# Patient Record
Sex: MALE | Employment: UNEMPLOYED | URBAN - METROPOLITAN AREA
[De-identification: names, ages, dates, MRNs, and addresses within clinical notes are randomized per-mention and may not be internally consistent; named-entity substitution may affect disease eponyms.]

---

## 2020-12-14 ENCOUNTER — VIRTUAL VISIT (OUTPATIENT)
Dept: PULMONOLOGY | Facility: CLINIC | Age: 35
End: 2020-12-14
Attending: GENETIC COUNSELOR, MS
Payer: COMMERCIAL

## 2020-12-14 DIAGNOSIS — Z83.49 FAMILY HISTORY OF CYSTIC FIBROSIS: ICD-10-CM

## 2020-12-14 DIAGNOSIS — K85.00 IDIOPATHIC ACUTE PANCREATITIS: Primary | ICD-10-CM

## 2020-12-14 DIAGNOSIS — E84.9 CYSTIC FIBROSIS (H): Primary | ICD-10-CM

## 2020-12-14 DIAGNOSIS — K85.00 IDIOPATHIC ACUTE PANCREATITIS, UNSPECIFIED COMPLICATION STATUS: ICD-10-CM

## 2020-12-14 DIAGNOSIS — Z71.83 ENCOUNTER FOR NONPROCREATIVE GENETIC COUNSELING: ICD-10-CM

## 2020-12-14 LAB — SWEAT CHLORIDE: NORMAL MMOL/L CL (ref 0–30)

## 2020-12-14 PROCEDURE — 89230 COLLECT SWEAT FOR TEST: CPT | Performed by: INTERNAL MEDICINE

## 2020-12-14 PROCEDURE — 96040 HC GENETIC COUNSELING, EACH 30 MINUTES: CPT | Mod: GT | Performed by: GENETIC COUNSELOR, MS

## 2020-12-14 RX ORDER — CARVEDILOL 6.25 MG/1
TABLET ORAL
COMMUNITY
Start: 2020-03-16 | End: 2024-05-14

## 2020-12-14 RX ORDER — LISINOPRIL 5 MG/1
TABLET ORAL
COMMUNITY
Start: 2020-03-16

## 2020-12-14 NOTE — LETTER
12/14/2020      RE: Cheo Padron  [address] [place] [address]       No notes on file    Nicol Langston GC

## 2020-12-14 NOTE — Clinical Note
12/14/2020      RE: Cheo Padron  [address] [place] [address]       No notes on file    Nicol Langston GC

## 2020-12-14 NOTE — LETTER
12/14/2020      RE: Cheo Padron  [address] [place] [address]       Presenting information:   Cheo Padron is a 35 year old male with a history of pancreatis and a family history of CFTR-related metabolic syndrome (CRMS). He was [facility] due to his personal inconclusive CFTR genetic testing and history of pancreatitis, and seen virtually today at [facility] after a sweat chloride test. I met with Cheo and his partner Jo by phone today to obtain a personal and family history, discuss the genetics and inheritance of cystic fibrosis, and to discuss Cheo's sweat chloride test results from today.     Personal History:   Cheo has two sons who have a diagnosis of CRMS with R117H and 5T-TG12 CFTR mutations. 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 This genetic testing was performed at [facility] and scanned into the EMR. Cheo was healthy at that time. However, recently he developed pancreatitis and was therefore referred to [facility] for a sweat chloride test and genetic counseling.    Cheo was diagnosed with pancreatitis at 35 years and notes 3 episodes with abdominal pain and vomiting in the last year. He also notes an incidence 1 year ago with abdominal pain/cramping that he did not seek evaluation for as it stopped soon. Previous clinic notes state this was thought to be ETOH related. Cheo notes history of alcohol and tobacco usage. Cheo does not follow with GI specifically for this history, though it may be in the works. I will see if our team is able to help place a GI referral.    Cheo denies history of pulmonary symptoms including no chronic cough, infections, or hospitalizations. He denies history of sinus problems, failure to thrive as a child,  and diabetes. His children were conceived naturally with no infertility.    Additional history includes coronary artery disease with a reported heart attach 8 years ago (~28 years), CHF, HTN, and elevated lipids. Given the young age of onset for this history, the family would be recommended to let their providers know so they can get appropriate screening.     Family History:   A three generation pedigree was obtained today and sent to be scanned into the EMR. The family history was significant for the following:    Wendy have two sons together, who are 10 and 8 years old. They reportedly were diagnosed with CRMS after birth and had genetic testing that found R117H and 5T-TG12 mutations in trans. They are doing well and [facility]. Cheo has a 17 year old daughter who has a different mother than his sons, who is healthy. Jo also has a 18 year old son (not biologically related to Cheo) who is a carrier for the R117H mutation and healthy.    Cheo has two full brothers and four maternal half siblings, who are in their 20's-40's and healthy. One half sister has no children; Cheo was unsure of if this was due to choice/lifestyle or infertility. Cheo's nieces/nephews are healthy.    Cheo's mother has a history of high blood pressure. She has three siblings, one of whom has a history of lung problems thought to be related to smoking. Cheo notes one maternal first cousin who had open heart surgery; maternal first cousins are otherwise healthy. Cheo's maternal grandmother passed in her late 60's from heart problems diagnosed at an unknown age. She had a history of open heart surgery. Cheo's maternal grandfather has passed but health history is unknown.    Cheo's father has a history of high blood pressure. Extended paternal health history was unknown.     The family history was otherwise negative for CF, pancreatitis, infertility, and known genetic conditions.    Cheo notes   ancestry. Known consanguinity was denied.     Discussion:   Genes are long stretches of DNA that are responsible for how our bodies look and how our bodies work. We all have two copies of every gene, one inherited from our mother and one inherited from our father. When there is a change, called a mutation, in a gene it can cause it to not do its job correctly which can cause the signs and symptoms of a genetic condition.      Classical cystic fibrosis (CF) causes a person to produce thick, sticky mucus due to an imbalance of salt and water in and out of the cells. This affects the lungs, sinuses, digestive system, and male reproductive system, among other areas of the body. Children who are diagnosed with classical cystic fibrosis need respiratory therapy several times a day, and may need to take pills to help their body to digest food. However, it is important to remember that there can be great variation in the symptoms that a person may have. There is great variability in symptoms in individuals with CF, where some individuals have very mild symptoms or are only diagnosed in adulthood as a result of an infertility work-up, while others have significant symptoms as infants and are diagnosed soon after birth. We can think of this as a spectrum. Genetic testing, sweat chloride testing, and someone's personal and family history all can help assess where someone may be in this spectrum, though we are not able to predict exactly what someone's presentation may look like or what exact symptoms they will develop when.     CFTR disorders including cystic fibrosis are caused by mutations in a gene called CFTR.  CFTR disorders are inherited in an autosomal recessive pattern.  This means that to be affected an individual must inherit a mutation in both copies of the CFTR gene (one from each parent). These exact mutations can be more severe or mild, which can affect presentation/severity for an  individual.      Individuals with just one mutation in the CFTR gene are said to be carriers.  Carriers do not have cystic fibrosis but can have a child with CF if their partner is also a carrier or has a CFTR disorder themselves.  When two parents are carriers, then with each pregnancy together, there is a 25% chance to have a child with cystic fibrosis, a 50% chance to have a child that is an unaffected carrier, and a 25% chance to have a child that is an unaffected non-carrier. No one has control over which copy they pass on to their child since it is a random process. These exact chances change if one parent personally has a CFTR disorder.     While most people with two CFTR mutations have classic cystic fibrosis, CFTR mutations can also cause milder CFTR disorders. The diagnosis of cystic fibrosis is made by a sweat chloride test of >60 mmol/L (normal range <30 mmol/L), and/or the presence of two severe CF-causing mutations in the CFTR gene.  A diagnosis of a CFTR-related metabolic syndrome (CRMS) is made when an individual has a positive  screen for CF, but at least one of their mutations is not classified as severe CF-causing (or genetic testing is inconclusive) and their sweat chloride test is <60 mmol/L.      Past Genetic Testing:  Cheo's previous genetic testing found a 5T-TG12 mutation and a variant of unknown significance p.N0192P (aka c.4028G>T) in CFTR. The numbers and letters in this result stand for exactly where in the gene the genetic change is located and what change was found.    The 5T change or mutation references a part of the CFTR gene. This portion of the CFTR gene is called the Poly-T tract. If one thinks of the genetic alphabet using letters A, C, T, and G, it is an area of the gene that has many T s in a row. If there are 5T repeats, this can act as a mild or possibly moderate CF mutation. In combination with another CF mutation, one inherited from each parent, this can  "potentially cause symptoms of CF. However, there is much variability seen between individuals, and we are not able to predict the symptoms, if any, individuals with this genetic combination might have.     The \"TG12  portions of the 5T-TG12 mutation also refers to a portion of the CFTR gene that has several  TG  s in a row. Every person has this section of the CFTR gene, and it is usually seen as 10TG, 11TG, 12TG, or 13TG. A person with 12TG or 13TG along with a 5T is known to be more likely to develop non-classic CF than a person with 10TG or 11TG along with a 5T.     The  p.L0039P mutation is a very rare mutation, and currently classified as a variant of unknown significance. It is unknown if this VUS is truly pathogenic or benign at this time. A variant of unknown significance (VUS) is a change in the DNA sequence of a particular gene where it is not known if it causes the gene to not work correctly/could cause a condition, or if it is normal benign variation that does not affect the gene. Usually more research/evidence over time is needed to determine a VUS's significance. It is certainly possible that at some point in the future more could be known about this variant and it could be reclassified. Even if this mutation is pathogenic, it is unknown at this time if it is a severe or mild CFTR mutation.    Sweat Chloride Test Results:  A sweat chloride test is a test used to diagnose an individual with cystic fibrosis. The possible results of the sweat test were reviewed briefly, including a positive, negative, or borderline result. A positive sweat chloride test is consistent with a diagnosis of cystic fibrosis.     Cheo's sweat test today returned positive at 67 and 69 mmol/L (normal range <30 mmol/L, diagnostic for CF >60 mmol/L). This is consistent with a diagnosis of cystic fibrosis for Cheo. We discussed that this diagnosis + Cheo's genetic testing (1 variable mutation and 1 VUS) do not clarify which " symptoms he may ever develop and when. Individuals with CF with at least one mild CFTR mutation can vary widely in symptoms (if any) and severity, though often do not have classic CF presentation.  It is certainly possible that CF at least plays a role in Cheo's pancreatitis. The fact that Cheo has done well other than pancreatitis is reassuring, but does not rule out other CF symptoms and establishing care with an adult CF provider would be recommended for Cheo. I will have the team call him to schedule this. Management would be discussed further at that time.    Given Cheo's positive sweat test, we discussed that we may be more suspicious that the p.[id number] VUS is a true CFTR mutation, though additional information is needed. I will ask Ambry if his positive sweat test would change this variant's classification or not, and if additional information is known about this variant currently. I will follow up with Cheo if additional information is known now, otherwise the family could check in about variant reclassification periodically.     We understand that this diagnosis and appointment can be a lot of information at once. We continue to be a resource for the family as they process this information and next steps. One good resource is the Cystic Fibrosis Foundation: https://www.cff.org/Life-With-CF/Daily-Life/Adult-Guide-to-Cystic-Fibrosis/. If other resources are helpful (medical, support, or both) I am happy to help assist with this at any time. My number was given today for any questions or requests.     Family Members:  Assuming Cheo has a mutation on each of his CFTR genes, no matter which copy he passes on, his children will inherit a mutation. Whether or not they have a CFTR disorder or are just a carrier depends on whether or not his partner was a carrier. If they are a carrier (like Jo), with each pregnancy there would be a 50% chance for a child to have a CFTR disorder and a 50% chance for a  child to be only a carrier. Predicted presentation/severity of a CFTR disorder depends on exact mutations an individual inherits. If a partner was not a carrier, chances for children to have a CFTR disorder would be low. [facility] with previous genetic counseling. Pre and  testing options for any future children were not reviewed today, but can be in the future if helpful. Lastly, Cheo's daughter would be expected to be at least a CF carrier (depending on if her mother was a carrier), and genetic counseling would be available to her (and her partner) when older and potentially thinking of starting a family, or sooner if she had any CF concerns.     Other asymptomatic family members also have an increased chance to be a carrier for CF and it is important for this information be shared with extended family. Specifically, we normally do not recommend familial testing for a VUS as we are unable to interpret its significance. However, familial carrier testing for the 5T mutation (and the VUS if later reclassified) and/or genetic counseling to discuss reproductive information for family members would be available if helpful. If another extended family member is found to be a carrier for CF, their partner could be tested to determine if he or she is also a carrier. Carrier testing can be pursued here at [facility] by calling our direct number, or by asking a PCP for a genetic counseling referral locally.     Lastly, Cheo's siblings could also have a CFTR-related disorder. A sweat test and genetics appointment to discuss the family history of CF would be recommended for siblings to assess if they have CF and subsequent management for them, especially if they have any concerning symptoms. We are happy to see additional family members at [place] if interested. I am also happy to write a family letter to share the above information and recommendations with siblings if  helpful.       It was a pleasure to meet with Cheo virtually today. He had no additional questions at this time. Contact information was shared for any future questions or concerns that arise.     Plan:   1. Cheo's sweat chloride test was positive today. This is consistent with a diagnosis of cystic fibrosis.  2. Cheo will be contacted about setting up an adult CF clinic appointment here given this diagnosis. Follow up as recommended by this provider.  3. I will ask [facility] about reclassification of his VUS, and follow up with Cheo if new information is known or variant classification changes.  4. I will see if the team can help place a GI referral for Cheo given his history of pancreatitis and new CF diagnosis.  5. Contact information was provided.        Ramona Langston MS, [facility]  Genetic Counselor  Division of Genetics and Metabolism  [facility]   Phone: [phone]  Pager: [phone]       CC: Patient, PCP, Dr. Alamo    Approx time spent phone: 57 minutes      Nicol Langston GC

## 2020-12-14 NOTE — LETTER
12/14/2020      RE: Cheo Padron  [address] [place] [address]       Presenting information:   Cheo Padron is a 35 year old male with a history of pancreatis and a family history of CFTR-related metabolic syndrome (CRMS). He was [facility] due to his personal inconclusive CFTR genetic testing and history of pancreatitis, and seen virtually today at [facility] after a sweat chloride test. I met with Cheo and his partner Jo by phone today to obtain a personal and family history, discuss the genetics and inheritance of cystic fibrosis, and to discuss Cheo's sweat chloride test results from today.     Personal History:   Cheo has two sons who have a diagnosis of CRMS with R117H and 5T-TG12 CFTR mutations. 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 This genetic testing was performed at [facility] and scanned into the EMR. Cheo was healthy at that time. However, recently he developed pancreatitis and was therefore referred to [facility] for a sweat chloride test and genetic counseling.    Cheo was diagnosed with pancreatitis at 35 years and notes 3 episodes with abdominal pain and vomiting in the last year. He also notes an incidence 1 year ago with abdominal pain/cramping that he did not seek evaluation for as it stopped soon. Previous clinic notes state this was thought to be ETOH related. Cheo notes history of alcohol and tobacco usage. Cheo does not follow with GI specifically for this history, though it may be in the works. I will see if our team is able to help place a GI referral.    Cheo denies history of pulmonary symptoms including no chronic cough, infections, or hospitalizations. He denies history of sinus problems, failure to thrive as a  child, and diabetes. His children were conceived naturally with no infertility.    Additional history includes coronary artery disease with a reported heart attach 8 years ago (~28 years), CHF, HTN, and elevated lipids. Given the young age of onset for this history, the family would be recommended to let their providers know so they can get appropriate screening.     Family History:   A three generation pedigree was obtained today and sent to be scanned into the EMR. The family history was significant for the following:    Wendy have two sons together, who are 10 and 8 years old. They reportedly were diagnosed with CRMS after birth and had genetic testing that found R117H and 5T-TG12 mutations in trans. They are doing well and [facility]. Cheo has a 17 year old daughter who has a different mother than his sons, who is healthy. Jo also has a 18 year old son (not biologically related to Cheo) who is a carrier for the R117H mutation and healthy.    Cheo has two full brothers and four maternal half siblings, who are in their 20's-40's and healthy. One half sister has no children; Cheo was unsure of if this was due to choice/lifestyle or infertility. Cheo's nieces/nephews are healthy.    Cheo's mother has a history of high blood pressure. She has three siblings, one of whom has a history of lung problems thought to be related to smoking. Cheo notes one maternal first cousin who had open heart surgery; maternal first cousins are otherwise healthy. Cheo's maternal grandmother passed in her late 60's from heart problems diagnosed at an unknown age. She had a history of open heart surgery. Cheo's maternal grandfather has passed but health history is unknown.    Cheo's father has a history of high blood pressure. Extended paternal health history was unknown.     The family history was otherwise negative for CF, pancreatitis, infertility, and known genetic conditions.    Cheo notes   ancestry. Known consanguinity was denied.     Discussion:   Genes are long stretches of DNA that are responsible for how our bodies look and how our bodies work. We all have two copies of every gene, one inherited from our mother and one inherited from our father. When there is a change, called a mutation, in a gene it can cause it to not do its job correctly which can cause the signs and symptoms of a genetic condition.      Classical cystic fibrosis (CF) causes a person to produce thick, sticky mucus due to an imbalance of salt and water in and out of the cells. This affects the lungs, sinuses, digestive system, and male reproductive system, among other areas of the body. Children who are diagnosed with classical cystic fibrosis need respiratory therapy several times a day, and may need to take pills to help their body to digest food. However, it is important to remember that there can be great variation in the symptoms that a person may have. There is great variability in symptoms in individuals with CF, where some individuals have very mild symptoms or are only diagnosed in adulthood as a result of an infertility work-up, while others have significant symptoms as infants and are diagnosed soon after birth. We can think of this as a spectrum. Genetic testing, sweat chloride testing, and someone's personal and family history all can help assess where someone may be in this spectrum, though we are not able to predict exactly what someone's presentation may look like or what exact symptoms they will develop when.     CFTR disorders including cystic fibrosis are caused by mutations in a gene called CFTR.  CFTR disorders are inherited in an autosomal recessive pattern.  This means that to be affected an individual must inherit a mutation in both copies of the CFTR gene (one from each parent). These exact mutations can be more severe or mild, which can affect presentation/severity for an  individual.      Individuals with just one mutation in the CFTR gene are said to be carriers.  Carriers do not have cystic fibrosis but can have a child with CF if their partner is also a carrier or has a CFTR disorder themselves.  When two parents are carriers, then with each pregnancy together, there is a 25% chance to have a child with cystic fibrosis, a 50% chance to have a child that is an unaffected carrier, and a 25% chance to have a child that is an unaffected non-carrier. No one has control over which copy they pass on to their child since it is a random process. These exact chances change if one parent personally has a CFTR disorder.     While most people with two CFTR mutations have classic cystic fibrosis, CFTR mutations can also cause milder CFTR disorders. The diagnosis of cystic fibrosis is made by a sweat chloride test of >60 mmol/L (normal range <30 mmol/L), and/or the presence of two severe CF-causing mutations in the CFTR gene.  A diagnosis of a CFTR-related metabolic syndrome (CRMS) is made when an individual has a positive  screen for CF, but at least one of their mutations is not classified as severe CF-causing (or genetic testing is inconclusive) and their sweat chloride test is <60 mmol/L.      Past Genetic Testing:  Cheo's previous genetic testing found a 5T-TG12 mutation and a variant of unknown significance p.[id number] (aka c.4028G>T) in CFTR. The numbers and letters in this result stand for exactly where in the gene the genetic change is located and what change was found.    The 5T change or mutation references a part of the CFTR gene. This portion of the CFTR gene is called the Poly-T tract. If one thinks of the genetic alphabet using letters A, C, T, and G, it is an area of the gene that has many T s in a row. If there are 5T repeats, this can act as a mild or possibly moderate CF mutation. In combination with another CF mutation, one inherited from each parent, this can  "potentially cause symptoms of CF. However, there is much variability seen between individuals, and we are not able to predict the symptoms, if any, individuals with this genetic combination might have.     The \"TG12  portions of the 5T-TG12 mutation also refers to a portion of the CFTR gene that has several  TG  s in a row. Every person has this section of the CFTR gene, and it is usually seen as 10TG, 11TG, 12TG, or 13TG. A person with 12TG or 13TG along with a 5T is known to be more likely to develop non-classic CF than a person with 10TG or 11TG along with a 5T.     The  p.T2470R mutation is a very rare mutation, and currently classified as a variant of unknown significance. It is unknown if this VUS is truly pathogenic or benign at this time. A variant of unknown significance (VUS) is a change in the DNA sequence of a particular gene where it is not known if it causes the gene to not work correctly/could cause a condition, or if it is normal benign variation that does not affect the gene. Usually more research/evidence over time is needed to determine a VUS's significance. It is certainly possible that at some point in the future more could be known about this variant and it could be reclassified. Even if this mutation is pathogenic, it is unknown at this time if it is a severe or mild CFTR mutation.    Sweat Chloride Test Results:  A sweat chloride test is a test used to diagnose an individual with cystic fibrosis. The possible results of the sweat test were reviewed briefly, including a positive, negative, or borderline result. A positive sweat chloride test is consistent with a diagnosis of cystic fibrosis.     Cheo's sweat test today returned positive at 67 and 69 mmol/L (normal range <30 mmol/L, diagnostic for CF >60 mmol/L). This is consistent with a diagnosis of cystic fibrosis for Cheo. We discussed that this diagnosis + Cheo's genetic testing (1 variable mutation and 1 VUS) do not clarify which " symptoms he may ever develop and when. Individuals with CF with at least one mild CFTR mutation can vary widely in symptoms (if any) and severity, though often do not have classic CF presentation.  It is certainly possible that CF at least plays a role in Cheo's pancreatitis. The fact that Cheo has done well other than pancreatitis is reassuring, but does not rule out other CF symptoms and establishing care with an adult CF provider would be recommended for Cheo. I will have the team call him to schedule this. Management would be discussed further at that time.    Given Cheo's positive sweat test, we discussed that we may be more suspicious that the p.Q4842M VUS is a true CFTR mutation, though additional information is needed. I will ask Ambry if his positive sweat test would change this variant's classification or not, and if additional information is known about this variant currently. I will follow up with Cheo if additional information is known now, otherwise the family could check in about variant reclassification periodically.     We understand that this diagnosis and appointment can be a lot of information at once. We continue to be a resource for the family as they process this information and next steps. One good resource is the Cystic Fibrosis Foundation: https://www.cff.org/Life-With-CF/Daily-Life/Adult-Guide-to-Cystic-Fibrosis/. If other resources are helpful (medical, support, or both) I am happy to help assist with this at any time. My number was given today for any questions or requests.     Family Members:  Assuming Cheo has a mutation on each of his CFTR genes, no matter which copy he passes on, his children will inherit a mutation. Whether or not they have a CFTR disorder or are just a carrier depends on whether or not his partner was a carrier. If they are a carrier (like Jo), with each pregnancy there would be a 50% chance for a child to have a CFTR disorder and a 50% chance for a  child to be only a carrier. Predicted presentation/severity of a CFTR disorder depends on exact mutations an individual inherits. If a partner was not a carrier, chances for children to have a CFTR disorder would be low. [facility] with previous genetic counseling. Pre and  testing options for any future children were not reviewed today, but can be in the future if helpful. Lastly, Cheo's daughter would be expected to be at least a CF carrier (depending on if her mother was a carrier), and genetic counseling would be available to her (and her partner) when older and potentially thinking of starting a family, or sooner if she had any CF concerns.     Other asymptomatic family members also have an increased chance to be a carrier for CF and it is important for this information be shared with extended family. Specifically, we normally do not recommend familial testing for a VUS as we are unable to interpret its significance. However, familial carrier testing for the 5T mutation (and the VUS if later reclassified) and/or genetic counseling to discuss reproductive information for family members would be available if helpful. If another extended family member is found to be a carrier for CF, their partner could be tested to determine if he or she is also a carrier. Carrier testing can be pursued here at [facility] by calling our direct number, or by asking a PCP for a genetic counseling referral locally.     Lastly, Cheo's siblings could also have a CFTR-related disorder. A sweat test and genetics appointment to discuss the family history of CF would be recommended for siblings to assess if they have CF and subsequent management for them, especially if they have any concerning symptoms. We are happy to see additional family members at [place] if interested. I am also happy to write a family letter to share the above information and recommendations with siblings if  helpful.       It was a pleasure to meet with Cheo virtually today. He had no additional questions at this time. Contact information was shared for any future questions or concerns that arise.     Plan:   1. Cheo's sweat chloride test was positive today. This is consistent with a diagnosis of cystic fibrosis.  2. Cheo will be contacted about setting up an adult CF clinic appointment here given this diagnosis. Follow up as recommended by this provider.  3. I will ask [facility] about reclassification of his VUS, and follow up with Cheo if new information is known or variant classification changes.  4. I will see if the team can help place a GI referral for Cheo given his history of pancreatitis and new CF diagnosis.  5. Contact information was provided.        Ramona Langston MS, [facility]  Genetic Counselor  Division of Genetics and Metabolism  [facility]   Phone: [phone]  Pager: [phone]       CC: Patient, PCP, Dr. Alamo    Approx time spent phone: 57 minutes

## 2020-12-14 NOTE — NURSING NOTE
"Cheo Padron is a 35 year old male who is being evaluated via a billable video visit.      The patient has been notified of following:     \"This video visit will be conducted via a call between you and your physician/provider. We have found that certain health care needs can be provided without the need for an in-person physical exam.  This service lets us provide the care you need with a video conversation.  If a prescription is necessary we can send it directly to your pharmacy.  If lab work is needed we can place an order for that and you can then stop by our lab to have the test done at a later time.    Video visits are billed at different rates depending on your insurance coverage.  Please reach out to your insurance provider with any questions.    If during the course of the call the physician/provider feels a video visit is not appropriate, you will not be charged for this service.\"     How would you like to obtain your AVS? Pato    Cheo Padron complains of  No chief complaint on file.      Patient has given verbal consent for Video visit? Yes    Patient would like the video invitation sent by: Text to cell phone: [phone]     I have reviewed and updated the patient's medication list, allergies and preferred pharmacy.      Smooth Miles LPN  "

## 2020-12-25 SDOH — HEALTH STABILITY: MENTAL HEALTH: HOW OFTEN DO YOU HAVE A DRINK CONTAINING ALCOHOL?: NOT ASKED

## 2020-12-25 SDOH — HEALTH STABILITY: MENTAL HEALTH: HOW MANY STANDARD DRINKS CONTAINING ALCOHOL DO YOU HAVE ON A TYPICAL DAY?: NOT ASKED

## 2020-12-25 SDOH — HEALTH STABILITY: MENTAL HEALTH: HOW OFTEN DO YOU HAVE 6 OR MORE DRINKS ON ONE OCCASION?: NOT ASKED

## 2020-12-26 NOTE — PROGRESS NOTES
"Reason for Visit  Cheo Padron is a 35 year old year old male who is being seen for Consult (New diagnosis of CF pancreastitis )    CF HPI  The patient was seen and examined by Presley Alamo MD   Cheo Padron is a 35 year old male  with HTN, hyperlipidemia, Mantoux positive, CHF and premature - CAD. He is a smoker. He is being seen today after w/u for pancreatitis and has a child with CF. H was worked up and it showed positive sweat test.    He was admitted in 7/2020 with alcohol induced pancreatitis.     His symptoms started in December 2019.  It started off with abdominal pain and nausea vomiting.  It lasted for about 3 to 4 days and then it resolved.  This was preceded by binge drinking.  He used to drink 3-4 drinks every day and sometimes on weekends up to 14-15 drinks.  The pain resolved and he was doing well up until February 2020.  This pain recurred along with nausea and vomiting.  This again lasted for a few days and resolved.  This also was associated with binge drinking episode.    The symptoms recurred in July 2020 at which time he did go to the emergency room to have it evaluated.  He was diagnosed with pancreatitis.  Since his hospital stay the pain in his abdomen is slowly improved.  He is now having about a level 3 pain on and off throughout the day.  He notices the pain is worse especially when he eats \"heavy meaty meal\".  The pain is of mild crampy quality most of the time.  And when he eats a heavy meal is the pain is more worse and it last longer.  He is taken to eating light foods mostly salads.  He has lost about 10 pounds in the last 6 months.  He does not having any nausea or vomiting at this time.  Does not have any constipation or loose stools.  He is taking up to 2-3 Tylenols per day to manage the pain.  He has also taken ibuprofen to manage the pain.    He has stopped drinking for good.  Current Outpatient Medications   Medication     acetaminophen (TYLENOL) 325 MG tablet "     atorvastatin (LIPITOR) 80 MG tablet     carvedilol (COREG) 6.25 MG tablet     fexofenadine (ALLEGRA) 180 MG tablet     ibuprofen (ADVIL/MOTRIN) 400 MG tablet     lisinopril (ZESTRIL) 5 MG tablet     No current facility-administered medications for this visit.      Allergies   Allergen Reactions     Enoxaparin Hives     Urticaria started 4 days after starting lovenox.    Unclear if rash due to lovenox but this seems most likely today (7/9/2012).    One week ago started metoprolol, asa, plavix, lisinopril, simvastatin, and spironolactone.       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     ST elevation myocardial infarction involving left anterior descending (LAD) coronary artery (H) 2012       Past Surgical History:   Procedure Laterality Date     CT CORONARY ANGIOGRAM  07/01/2012    Has two drug eluting stents       Social History     Socioeconomic History     Marital status: Single     Spouse name: Not on file     Number of children: Not on file     Years of education: Not on file     Highest education level: Not on file   Occupational History     Not on file   Social Needs     Financial resource strain: Not on file     Food insecurity     Worry: Not on file     Inability: Not on file     Transportation needs     Medical: Not on file     Non-medical: Not on file   Tobacco Use     Smoking status: Current Every Day Smoker     Types: Cigarettes     Smokeless tobacco: Never Used   Substance and Sexual Activity     Alcohol use: Not Currently     Drug use: Not Currently     Sexual activity: Not on file   Lifestyle     Physical activity     Days per week: Not on file     Minutes per session: Not on file     Stress: Not on file   Relationships     Social connections     Talks on phone: Not on file     Gets together: Not on file  "    Attends Jewish service: Not on file     Active member of club or organization: Not on file     Attends meetings of clubs or organizations: Not on file     Relationship status: Not on file     Intimate partner violence     Fear of current or ex partner: Not on file     Emotionally abused: Not on file     Physically abused: Not on file     Forced sexual activity: Not on file   Other Topics Concern     Parent/sibling w/ CABG, MI or angioplasty before 65F 55M? Not Asked   Social History Narrative     Not on file       ROS Pulmonary  Constitutional- Negative  Eyes- Negative  Ear, nose and throat- Has allergies and ear is plugged up.  Cardiac- Negative  Pulm- Negative  GI- See HPI  Genitourinary- Negative  Musculoskeletal- Negative  Neurology- Negative  Dermatology- Negative  Endocrine- Negative  Lymphatic- Negative  Psychiatry- Negative    A complete ROS was otherwise negative except as noted in the HPI.  /84   Pulse 72   Resp 17   Ht 1.709 m (5' 7.3\")   Wt 66.2 kg (146 lb)   SpO2 95%   BMI 22.66 kg/m     Exam:   GENERAL APPEARANCE: Well developed, well nourished, alert, and in no apparent distress.  EYES: PERRL, EOMI  HENT: Nasal mucosa with no edema and no hyperemia. No nasal polyps.  EARS: Both Ear canals full of \"Wax\", TMs normal  MOUTH: Oral mucosa is moist, without any lesions, no tonsillar enlargement, no oropharyngeal exudate.  NECK: supple, no masses, no thyromegaly.  LYMPHATICS: No significant axillary, cervical, or supraclavicular nodes.  RESP: normal percussion, good air flow throughout.  No crackles. No rhonchi. No wheezes.  CV: Normal S1, S2, regular rhythm, normal rate. No murmur.  No rub. No gallop. No LE edema.   ABDOMEN:  Bowel sounds normal, soft, nontender, no HSM or masses.   MS: extremities normal. No clubbing. No cyanosis.  SKIN: no rash on limited exam  NEURO: Mentation intact, speech normal, normal strength and tone, normal gait and stance  PSYCH: mentation appears normal. and " affect normal/bright.    Results:  Recent Results (from the past 168 hour(s))   General PFT Lab (Please always keep checked)    Collection Time: 12/28/20  7:49 AM   Result Value Ref Range    FVC-Pred 4.47 L    FVC-Pre 3.67 L    FVC-%Pred-Pre 82 %    FEV1-Pre 3.13 L    FEV1-%Pred-Pre 84 %    FEV1FVC-Pred 83 %    FEV1FVC-Pre 85 %    FEFMax-Pred 9.64 L/sec    FEFMax-Pre 8.89 L/sec    FEFMax-%Pred-Pre 92 %    FEF2575-Pred 3.81 L/sec    FEF2575-Pre 3.61 L/sec    RDA1210-%Pred-Pre 94 %    ExpTime-Pre 6.73 sec    FIFMax-Pre 6.04 L/sec    FEV1FEV6-Pred 82 %    FEV1FEV6-Pre 85 %         Results as noted above.    PFT Interpretation:  Normal spirometry.  Valid Maneuver      Assessment and plan: Cheo Padron is a 35 year old male  with HTN, hyperlipidemia, CHF and premature - CAD. He is a smoker. He is being seen today after w/u for pancreatitis showed positive sweat test.    #. CF vs. CFTR disorder: He has essentially normal lung function with no pulmonary symptoms. His CF sweat chloride tests were positive.    CF genetics: 5T-TG12 plus a VUS ([id number]).   Will have CF  weigh in on this.  Last CXR from 9/14/2016 per report did not show any bronchiectasis.  We will repeat Spirometry at next visit and check CXR today.    #. Pancreatitis: His first episode was on 7/2012 requiring hospitalization. He has had atleast two other episodes prior to that. it was felt to be ETOH induced, it is difficult to r/o  or rule in CF playing a role. His ongoing sx while not typically raise the question of chronic pancreatitis.   - We will try enzymes to see if this will help.  - Consult Dr. Beth (GI)    #. Premature CAD: Followed by Dr. Rothman. He had presented with STEMI on 7/1/2012. This required revasc with drug eluting stents x2 from prox to mid LAD.   - currently on Aspirin.    #. Ischemic CMP: His last MRI in 7/2012 was 41% with large WMA consistent LAD distribution infarction. Last Echo on 7/2013 showed an EF of 40 - 45%  with akinesis of the antr and anteroseptal walls from base to apex. No significant valvular disease.  Last Stress test on 5/2015  was neg with 12.8 METS achieved    #. HTN: BP well controlled on Carvedilol and ACEI.    #. Hypertriglyceridemia: He is on Statins.    #. Ongoing Tobacco abuse: He has attempted to quit smoking unsuccessfully and would like to quit. He has Chantix at home. We will continue to encourage him.    #. HCM: Has not had flu vaccination todate.  - We will obtain annual labs.    Presley Alamo MD.  Pulmonary and Critical Care.  12/28/2020  10:02 AM

## 2020-12-28 ENCOUNTER — OFFICE VISIT (OUTPATIENT)
Dept: PULMONOLOGY | Facility: CLINIC | Age: 35
End: 2020-12-28
Attending: INTERNAL MEDICINE
Payer: COMMERCIAL

## 2020-12-28 ENCOUNTER — ANCILLARY PROCEDURE (OUTPATIENT)
Dept: GENERAL RADIOLOGY | Facility: CLINIC | Age: 35
End: 2020-12-28
Attending: INTERNAL MEDICINE
Payer: COMMERCIAL

## 2020-12-28 VITALS
HEIGHT: 67 IN | OXYGEN SATURATION: 95 % | RESPIRATION RATE: 17 BRPM | WEIGHT: 146 LBS | DIASTOLIC BLOOD PRESSURE: 84 MMHG | BODY MASS INDEX: 22.91 KG/M2 | SYSTOLIC BLOOD PRESSURE: 128 MMHG | HEART RATE: 72 BPM

## 2020-12-28 DIAGNOSIS — K85.00 IDIOPATHIC ACUTE PANCREATITIS WITHOUT INFECTION OR NECROSIS: ICD-10-CM

## 2020-12-28 DIAGNOSIS — Z83.49 FAMILY HISTORY OF CYSTIC FIBROSIS: ICD-10-CM

## 2020-12-28 DIAGNOSIS — Z83.49 FAMILY HISTORY OF CYSTIC FIBROSIS: Primary | ICD-10-CM

## 2020-12-28 LAB
ALBUMIN SERPL-MCNC: 4.1 G/DL (ref 3.4–5)
ALBUMIN UR-MCNC: NEGATIVE MG/DL
ALP SERPL-CCNC: 80 U/L (ref 40–150)
ALT SERPL W P-5'-P-CCNC: 19 U/L (ref 0–70)
ANION GAP SERPL CALCULATED.3IONS-SCNC: 4 MMOL/L (ref 3–14)
APPEARANCE UR: CLEAR
AST SERPL W P-5'-P-CCNC: 16 U/L (ref 0–45)
BASOPHILS # BLD AUTO: 0 10E9/L (ref 0–0.2)
BASOPHILS NFR BLD AUTO: 0.4 %
BILIRUB UR QL STRIP: NEGATIVE
BUN SERPL-MCNC: 4 MG/DL (ref 7–30)
CALCIUM SERPL-MCNC: 8.9 MG/DL (ref 8.5–10.1)
CHLORIDE SERPL-SCNC: 107 MMOL/L (ref 94–109)
CHOLEST SERPL-MCNC: 160 MG/DL
CO2 SERPL-SCNC: 30 MMOL/L (ref 20–32)
COLOR UR AUTO: YELLOW
CREAT SERPL-MCNC: 0.87 MG/DL (ref 0.66–1.25)
CREAT UR-MCNC: 308 MG/DL
DEPRECATED CALCIDIOL+CALCIFEROL SERPL-MC: 11 UG/L (ref 20–75)
DIFFERENTIAL METHOD BLD: NORMAL
EOSINOPHIL # BLD AUTO: 0.1 10E9/L (ref 0–0.7)
EOSINOPHIL NFR BLD AUTO: 1.3 %
ERYTHROCYTE [DISTWIDTH] IN BLOOD BY AUTOMATED COUNT: 11.3 % (ref 10–15)
ERYTHROCYTE [SEDIMENTATION RATE] IN BLOOD BY WESTERGREN METHOD: 8 MM/H (ref 0–15)
EXPTIME-PRE: 6.73 SEC
FEF2575-%PRED-PRE: 94 %
FEF2575-PRE: 3.61 L/SEC
FEF2575-PRED: 3.81 L/SEC
FEFMAX-%PRED-PRE: 92 %
FEFMAX-PRE: 8.89 L/SEC
FEFMAX-PRED: 9.64 L/SEC
FEV1-%PRED-PRE: 84 %
FEV1-PRE: 3.13 L
FEV1FEV6-PRE: 85 %
FEV1FEV6-PRED: 82 %
FEV1FVC-PRE: 85 %
FEV1FVC-PRED: 83 %
FIFMAX-PRE: 6.04 L/SEC
FVC-%PRED-PRE: 82 %
FVC-PRE: 3.67 L
FVC-PRED: 4.47 L
GFR SERPL CREATININE-BSD FRML MDRD: >90 ML/MIN/{1.73_M2}
GGT SERPL-CCNC: 37 U/L (ref 0–75)
GLUCOSE SERPL-MCNC: 95 MG/DL (ref 70–99)
GLUCOSE UR STRIP-MCNC: NEGATIVE MG/DL
HBA1C MFR BLD: 5.1 % (ref 0–5.6)
HCT VFR BLD AUTO: 46.2 % (ref 40–53)
HDLC SERPL-MCNC: 38 MG/DL
HGB BLD-MCNC: 15.6 G/DL (ref 13.3–17.7)
HGB UR QL STRIP: NEGATIVE
IMM GRANULOCYTES # BLD: 0 10E9/L (ref 0–0.4)
IMM GRANULOCYTES NFR BLD: 0.2 %
INR PPP: 1.03 (ref 0.86–1.14)
IRON SERPL-MCNC: 56 UG/DL (ref 35–180)
KETONES UR STRIP-MCNC: NEGATIVE MG/DL
LDLC SERPL CALC-MCNC: 86 MG/DL
LEUKOCYTE ESTERASE UR QL STRIP: NEGATIVE
LYMPHOCYTES # BLD AUTO: 1.9 10E9/L (ref 0.8–5.3)
LYMPHOCYTES NFR BLD AUTO: 33.9 %
MAGNESIUM SERPL-MCNC: 2.2 MG/DL (ref 1.6–2.3)
MCH RBC QN AUTO: 32.1 PG (ref 26.5–33)
MCHC RBC AUTO-ENTMCNC: 33.8 G/DL (ref 31.5–36.5)
MCV RBC AUTO: 95 FL (ref 78–100)
MICROALBUMIN UR-MCNC: 16 MG/L
MICROALBUMIN/CREAT UR: 5.16 MG/G CR (ref 0–17)
MONOCYTES # BLD AUTO: 0.6 10E9/L (ref 0–1.3)
MONOCYTES NFR BLD AUTO: 10.4 %
MUCOUS THREADS #/AREA URNS LPF: PRESENT /LPF
NEUTROPHILS # BLD AUTO: 3 10E9/L (ref 1.6–8.3)
NEUTROPHILS NFR BLD AUTO: 53.8 %
NITRATE UR QL: NEGATIVE
NONHDLC SERPL-MCNC: 121 MG/DL
NRBC # BLD AUTO: 0 10*3/UL
NRBC BLD AUTO-RTO: 0 /100
PH UR STRIP: 5 PH (ref 5–7)
PHOSPHATE SERPL-MCNC: 2.2 MG/DL (ref 2.5–4.5)
PLATELET # BLD AUTO: 314 10E9/L (ref 150–450)
POTASSIUM SERPL-SCNC: 3.9 MMOL/L (ref 3.4–5.3)
PROT SERPL-MCNC: 8.1 G/DL (ref 6.8–8.8)
RBC # BLD AUTO: 4.86 10E12/L (ref 4.4–5.9)
RBC #/AREA URNS AUTO: 1 /HPF (ref 0–2)
SODIUM SERPL-SCNC: 141 MMOL/L (ref 133–144)
SOURCE: ABNORMAL
SP GR UR STRIP: 1.02 (ref 1–1.03)
TRIGL SERPL-MCNC: 175 MG/DL
TSH SERPL DL<=0.005 MIU/L-ACNC: 2.17 MU/L (ref 0.4–4)
UROBILINOGEN UR STRIP-MCNC: 2 MG/DL (ref 0–2)
WBC # BLD AUTO: 5.6 10E9/L (ref 4–11)
WBC #/AREA URNS AUTO: 12 /HPF (ref 0–5)

## 2020-12-28 PROCEDURE — 99000 SPECIMEN HANDLING OFFICE-LAB: CPT | Performed by: PATHOLOGY

## 2020-12-28 PROCEDURE — 85652 RBC SED RATE AUTOMATED: CPT | Performed by: PATHOLOGY

## 2020-12-28 PROCEDURE — 94375 RESPIRATORY FLOW VOLUME LOOP: CPT | Performed by: INTERNAL MEDICINE

## 2020-12-28 PROCEDURE — 81001 URINALYSIS AUTO W/SCOPE: CPT | Performed by: PATHOLOGY

## 2020-12-28 PROCEDURE — 84155 ASSAY OF PROTEIN SERUM: CPT | Performed by: PATHOLOGY

## 2020-12-28 PROCEDURE — 82977 ASSAY OF GGT: CPT | Performed by: PATHOLOGY

## 2020-12-28 PROCEDURE — G0463 HOSPITAL OUTPT CLINIC VISIT: HCPCS | Mod: 25

## 2020-12-28 PROCEDURE — 84403 ASSAY OF TOTAL TESTOSTERONE: CPT | Mod: 90 | Performed by: PATHOLOGY

## 2020-12-28 PROCEDURE — 80061 LIPID PANEL: CPT | Performed by: PATHOLOGY

## 2020-12-28 PROCEDURE — 71046 X-RAY EXAM CHEST 2 VIEWS: CPT | Mod: GC | Performed by: RADIOLOGY

## 2020-12-28 PROCEDURE — 82785 ASSAY OF IGE: CPT | Performed by: PATHOLOGY

## 2020-12-28 PROCEDURE — 83735 ASSAY OF MAGNESIUM: CPT | Performed by: PATHOLOGY

## 2020-12-28 PROCEDURE — 85025 COMPLETE CBC W/AUTO DIFF WBC: CPT | Performed by: PATHOLOGY

## 2020-12-28 PROCEDURE — 82306 VITAMIN D 25 HYDROXY: CPT | Performed by: PATHOLOGY

## 2020-12-28 PROCEDURE — 84443 ASSAY THYROID STIM HORMONE: CPT | Performed by: PATHOLOGY

## 2020-12-28 PROCEDURE — 36415 COLL VENOUS BLD VENIPUNCTURE: CPT | Performed by: PATHOLOGY

## 2020-12-28 PROCEDURE — 82784 ASSAY IGA/IGD/IGG/IGM EACH: CPT | Performed by: PATHOLOGY

## 2020-12-28 PROCEDURE — 84590 ASSAY OF VITAMIN A: CPT | Mod: 90 | Performed by: PATHOLOGY

## 2020-12-28 PROCEDURE — 84460 ALANINE AMINO (ALT) (SGPT): CPT | Performed by: PATHOLOGY

## 2020-12-28 PROCEDURE — 82043 UR ALBUMIN QUANTITATIVE: CPT | Performed by: PATHOLOGY

## 2020-12-28 PROCEDURE — 84446 ASSAY OF VITAMIN E: CPT | Mod: 90 | Performed by: PATHOLOGY

## 2020-12-28 PROCEDURE — 83540 ASSAY OF IRON: CPT | Performed by: PATHOLOGY

## 2020-12-28 PROCEDURE — 83036 HEMOGLOBIN GLYCOSYLATED A1C: CPT | Performed by: PATHOLOGY

## 2020-12-28 PROCEDURE — 84450 TRANSFERASE (AST) (SGOT): CPT | Performed by: PATHOLOGY

## 2020-12-28 PROCEDURE — 84075 ASSAY ALKALINE PHOSPHATASE: CPT | Performed by: PATHOLOGY

## 2020-12-28 PROCEDURE — 99204 OFFICE O/P NEW MOD 45 MIN: CPT | Mod: 25 | Performed by: INTERNAL MEDICINE

## 2020-12-28 PROCEDURE — 80069 RENAL FUNCTION PANEL: CPT | Performed by: PATHOLOGY

## 2020-12-28 PROCEDURE — 87070 CULTURE OTHR SPECIMN AEROBIC: CPT | Performed by: INTERNAL MEDICINE

## 2020-12-28 PROCEDURE — 85610 PROTHROMBIN TIME: CPT | Performed by: PATHOLOGY

## 2020-12-28 RX ORDER — ATORVASTATIN CALCIUM 80 MG/1
80 TABLET, FILM COATED ORAL
COMMUNITY
Start: 2020-03-16 | End: 2024-05-14

## 2020-12-28 RX ORDER — IBUPROFEN 400 MG/1
400 TABLET, FILM COATED ORAL EVERY 6 HOURS PRN
COMMUNITY
Start: 2020-12-28

## 2020-12-28 RX ORDER — OXYCODONE HYDROCHLORIDE 5 MG/1
5-10 TABLET ORAL
COMMUNITY
Start: 2020-07-08 | End: 2020-12-28

## 2020-12-28 RX ORDER — FEXOFENADINE HCL 180 MG/1
180 TABLET ORAL
COMMUNITY
End: 2024-05-14

## 2020-12-28 RX ORDER — ACETAMINOPHEN 325 MG/1
650 TABLET ORAL EVERY 6 HOURS PRN
COMMUNITY
Start: 2020-12-28

## 2020-12-28 ASSESSMENT — PAIN SCALES - GENERAL: PAINLEVEL: MILD PAIN (2)

## 2020-12-28 ASSESSMENT — MIFFLIN-ST. JEOR: SCORE: 1555.64

## 2020-12-28 NOTE — LETTER
"    12/28/2020         RE: Cheo Padron  [address] [place] [address]        Dear Colleague,    Thank you for referring your patient, Cheo Padron, to the [facility] [place]. Please see a copy of my visit note below.    Reason for Visit  Cheo Padron is a 35 year old year old male who is being seen for Consult (New diagnosis of CF pancreastitis )    CF HPI  The patient was seen and examined by Presley Alamo MD   Cheo Padron is a 35 year old male  with HTN, hyperlipidemia, Mantoux positive, CHF and premature - CAD. He is a smoker. He is being seen today after w/u for pancreatitis and has a child with CF. H was worked up and it showed positive sweat test.    He was admitted in 7/2020 with alcohol induced pancreatitis.     His symptoms started in December 2019.  It started off with abdominal pain and nausea vomiting.  It lasted for about 3 to 4 days and then it resolved.  This was preceded by binge drinking.  He used to drink 3-4 drinks every day and sometimes on weekends up to 14-15 drinks.  The pain resolved and he was doing well up until February 2020.  This pain recurred along with nausea and vomiting.  This again lasted for a few days and resolved.  This also was associated with binge drinking episode.    The symptoms recurred in July 2020 at which time he did go to the emergency room to have it evaluated.  He was diagnosed with pancreatitis.  Since his hospital stay the pain in his abdomen is slowly improved.  He is now having about a level 3 pain on and off throughout the day.  He notices the pain is worse especially when he eats \"heavy meaty meal\".  The pain is of mild crampy quality most of the time.  And when he eats a heavy meal is the pain is more worse and it last longer.  He is taken to eating light foods mostly salads.  He has lost about 10 pounds in the last 6 months.  He does not having any nausea or vomiting at " this time.  Does not have any constipation or loose stools.  He is taking up to 2-3 Tylenols per day to manage the pain.  He has also taken ibuprofen to manage the pain.    He has stopped drinking for good.  Current Outpatient Medications   Medication     acetaminophen (TYLENOL) 325 MG tablet     atorvastatin (LIPITOR) 80 MG tablet     carvedilol (COREG) 6.25 MG tablet     fexofenadine (ALLEGRA) 180 MG tablet     ibuprofen (ADVIL/MOTRIN) 400 MG tablet     lisinopril (ZESTRIL) 5 MG tablet     No current facility-administered medications for this visit.      Allergies   Allergen Reactions     Enoxaparin Hives     Urticaria started 4 days after starting lovenox.    Unclear if rash due to lovenox but this seems most likely today (7/9/2012).    One week ago started metoprolol, asa, plavix, lisinopril, simvastatin, and spironolactone.       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     ST elevation myocardial infarction involving left anterior descending (LAD) coronary artery (H) 2012       Past Surgical History:   Procedure Laterality Date     CT CORONARY ANGIOGRAM  07/01/2012    Has two drug eluting stents       Social History     Socioeconomic History     Marital status: Single     Spouse name: Not on file     Number of children: Not on file     Years of education: Not on file     Highest education level: Not on file   Occupational History     Not on file   Social Needs     Financial resource strain: Not on file     Food insecurity     Worry: Not on file     Inability: Not on file     Transportation needs     Medical: Not on file     Non-medical: Not on file   Tobacco Use     Smoking status: Current Every Day Smoker     Types: Cigarettes     Smokeless tobacco: Never Used   Substance and Sexual Activity     Alcohol use: Not  "Currently     Drug use: Not Currently     Sexual activity: Not on file   Lifestyle     Physical activity     Days per week: Not on file     Minutes per session: Not on file     Stress: Not on file   Relationships     Social connections     Talks on phone: Not on file     Gets together: Not on file     Attends Confucianist service: Not on file     Active member of club or organization: Not on file     Attends meetings of clubs or organizations: Not on file     Relationship status: Not on file     Intimate partner violence     Fear of current or ex partner: Not on file     Emotionally abused: Not on file     Physically abused: Not on file     Forced sexual activity: Not on file   Other Topics Concern     Parent/sibling w/ CABG, MI or angioplasty before 65F 55M? Not Asked   Social History Narrative     Not on file       ROS Pulmonary  Constitutional- Negative  Eyes- Negative  Ear, nose and throat- Has allergies and ear is plugged up.  Cardiac- Negative  Pulm- Negative  GI- See HPI  Genitourinary- Negative  Musculoskeletal- Negative  Neurology- Negative  Dermatology- Negative  Endocrine- Negative  Lymphatic- Negative  Psychiatry- Negative    A complete ROS was otherwise negative except as noted in the HPI.  /84   Pulse 72   Resp 17   Ht 1.709 m (5' 7.3\")   Wt 66.2 kg (146 lb)   SpO2 95%   BMI 22.66 kg/m     Exam:   GENERAL APPEARANCE: Well developed, well nourished, alert, and in no apparent distress.  EYES: PERRL, EOMI  HENT: Nasal mucosa with no edema and no hyperemia. No nasal polyps.  EARS: Both Ear canals full of \"Wax\", TMs normal  MOUTH: Oral mucosa is moist, without any lesions, no tonsillar enlargement, no oropharyngeal exudate.  NECK: supple, no masses, no thyromegaly.  LYMPHATICS: No significant axillary, cervical, or supraclavicular nodes.  RESP: normal percussion, good air flow throughout.  No crackles. No rhonchi. No wheezes.  CV: Normal S1, S2, regular rhythm, normal rate. No murmur.  No rub. No " gallop. No LE edema.   ABDOMEN:  Bowel sounds normal, soft, nontender, no HSM or masses.   MS: extremities normal. No clubbing. No cyanosis.  SKIN: no rash on limited exam  NEURO: Mentation intact, speech normal, normal strength and tone, normal gait and stance  PSYCH: mentation appears normal. and affect normal/bright.    Results:  Recent Results (from the past 168 hour(s))   General PFT Lab (Please always keep checked)    Collection Time: 12/28/20  7:49 AM   Result Value Ref Range    FVC-Pred 4.47 L    FVC-Pre 3.67 L    FVC-%Pred-Pre 82 %    FEV1-Pre 3.13 L    FEV1-%Pred-Pre 84 %    FEV1FVC-Pred 83 %    FEV1FVC-Pre 85 %    FEFMax-Pred 9.64 L/sec    FEFMax-Pre 8.89 L/sec    FEFMax-%Pred-Pre 92 %    FEF2575-Pred 3.81 L/sec    FEF2575-Pre 3.61 L/sec    MEA4996-%Pred-Pre 94 %    ExpTime-Pre 6.73 sec    FIFMax-Pre 6.04 L/sec    FEV1FEV6-Pred 82 %    FEV1FEV6-Pre 85 %         Results as noted above.    PFT Interpretation:  Normal spirometry.  Valid Maneuver      Assessment and plan: Cheo Padron is a 35 year old male  with HTN, hyperlipidemia, CHF and premature - CAD. He is a smoker. He is being seen today after w/u for pancreatitis showed positive sweat test.    #. CF vs. CFTR disorder: He has essentially normal lung function with no pulmonary symptoms. His CF sweat chloride tests were positive.    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   Will have CF  weigh in on this.  Last CXR from 9/14/2016 per report did not show any bronchiectasis.  We will repeat Spirometry at next visit and check CXR today.    #. Pancreatitis: His first episode was on 7/2012 requiring hospitalization. He has had atleast two other episodes prior to that. it was felt to be ETOH induced, it is difficult to r/o  or rule in CF playing a role. His ongoing sx while not typically raise the question of chronic pancreatitis.   - We will try enzymes to see if this will help.  - Consult Dr. Beth (GI)    #. Premature CAD: Followed by  Dr. Rothman. He had presented with STEMI on 7/1/2012. This required revasc with drug eluting stents x2 from prox to mid LAD.   - currently on Aspirin.    #. Ischemic CMP: His last MRI in 7/2012 was 41% with large WMA consistent LAD distribution infarction. Last Echo on 7/2013 showed an EF of 40 - 45% with akinesis of the antr and anteroseptal walls from base to apex. No significant valvular disease.  Last Stress test on 5/2015  was neg with 12.8 METS achieved    #. HTN: BP well controlled on Carvedilol and ACEI.    #. Hypertriglyceridemia: He is on Statins.    #. Ongoing Tobacco abuse: He has attempted to quit smoking unsuccessfully and would like to quit. He has Chantix at home. We will continue to encourage him.    #. HCM: Has not had flu vaccination todate.  - We will obtain annual labs.    Presley Alamo MD.  Pulmonary and Critical Care.  12/28/2020  10:02 AM         Again, thank you for allowing me to participate in the care of your patient.        Sincerely,        Presley Alamo MD

## 2020-12-28 NOTE — LETTER
Date:January 21, 2021      Patient was self referred, no letter generated. Do not send.        [facility] Health Information

## 2020-12-29 LAB
IGA SERPL-MCNC: 346 MG/DL (ref 84–499)
IGG SERPL-MCNC: 1499 MG/DL (ref 610–1616)
IGM SERPL-MCNC: 61 MG/DL (ref 35–242)

## 2020-12-30 LAB
A-TOCOPHEROL VIT E SERPL-MCNC: 7.2 MG/L (ref 5.5–18)
ANNOTATION COMMENT IMP: NORMAL
BETA+GAMMA TOCOPHEROL SERPL-MCNC: 1.7 MG/L (ref 0–6)
IGE SERPL-ACNC: 619 KIU/L (ref 0–114)
RETINYL PALMITATE SERPL-MCNC: <0.02 MG/L (ref 0–0.1)
TESTOST SERPL-MCNC: 717 NG/DL (ref 240–950)
VIT A SERPL-MCNC: 0.44 MG/L (ref 0.3–1.2)

## 2021-01-02 LAB
BACTERIA SPEC CULT: NORMAL
Lab: NORMAL
SPECIMEN SOURCE: NORMAL

## 2021-01-05 ENCOUNTER — TELEPHONE (OUTPATIENT)
Dept: PULMONOLOGY | Facility: CLINIC | Age: 36
End: 2021-01-05

## 2021-01-05 DIAGNOSIS — Z83.49 FAMILY HISTORY OF CYSTIC FIBROSIS: Primary | ICD-10-CM

## 2021-01-05 DIAGNOSIS — E84.9 CYSTIC FIBROSIS (H): ICD-10-CM

## 2021-01-05 NOTE — TELEPHONE ENCOUNTER
[facility]  January 5, 2021    No Ref-Primary, Physician    Cystic fibrosis Provider: Presley Alamo MD    Caller: s/o Jo    Clinical information:  Jo called to report that Cheo is still having trouble eating, wondering what they can do. She did order him a case of ensure.      Plan:   Awaiting response from Dr. Alamo  Discussed with [place], recommended to get a fecal elastase tests as he will need this  Has GI follow up next Friday 1/15/21    Call back with any new or worsening symptoms/concerns.    Caller verbalized understanding of plan and agrees with advice given.

## 2021-02-19 NOTE — PROGRESS NOTES
Presenting information:   Cheo Padron is a 35 year old male with a history of pancreatis and a family history of CFTR-related metabolic syndrome (CRMS). He was [facility] to his personal inconclusive CFTR genetic testing and history of pancreatitis, and seen virtually today at [facility] after a sweat chloride test. I met with Cheo and his partner Jo by phone today to obtain a personal and family history, discuss the genetics and inheritance of cystic fibrosis, and to discuss Cheo's sweat chloride test results from today.     Personal History:   Cheo has two sons who have a diagnosis of CRMS with R117H and 5T-TG12 CFTR mutations. 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 This genetic testing was performed at [facility] and scanned into the EMR. Cheo was healthy at that time. However, recently he developed pancreatitis and was therefore referred to [facility] for a sweat chloride test and genetic counseling.    Cheo was diagnosed with pancreatitis at 35 years and notes 3 episodes with abdominal pain and vomiting in the last year. He also notes an incidence 1 year ago with abdominal pain/cramping that he did not seek evaluation for as it stopped soon. Previous clinic notes state this was thought to be ETOH related. Cheo notes history of alcohol and tobacco usage. Cheo does not follow with GI specifically for this history, though it may be in the works. I will see if our team is able to help place a GI referral.    Cheo denies history of pulmonary symptoms including no chronic cough, infections, or hospitalizations. He denies history of sinus problems, failure to thrive as a child, and diabetes. His children were conceived naturally with no  infertility.    Additional history includes coronary artery disease with a reported heart attach 8 years ago (~28 years), CHF, HTN, and elevated lipids. Given the young age of onset for this history, the family would be recommended to let their providers know so they can get appropriate screening.     Family History:   A three generation pedigree was obtained today and sent to be scanned into the EMR. The family history was significant for the following:    Wendy have two sons together, who are 10 and 8 years old. They reportedly were diagnosed with CRMS after birth and had genetic testing that found R117H and 5T-TG12 mutations in trans. They are doing well and [facility]. Cheo has a 17 year old daughter who has a different mother than his sons, who is healthy. Jo also has a 18 year old son (not biologically related to Cheo) who is a carrier for the R117H mutation and healthy.    hCeo has two full brothers and four maternal half siblings, who are in their 20's-40's and healthy. One half sister has no children; Cheo was unsure of if this was due to choice/lifestyle or infertility. Cheo's nieces/nephews are healthy.    Cheo's mother has a history of high blood pressure. She has three siblings, one of whom has a history of lung problems thought to be related to smoking. Cheo notes one maternal first cousin who had open heart surgery; maternal first cousins are otherwise healthy. Cheo's maternal grandmother passed in her late 60's from heart problems diagnosed at an unknown age. She had a history of open heart surgery. Cheo's maternal grandfather has passed but health history is unknown.    Cheo's father has a history of high blood pressure. Extended paternal health history was unknown.     The family history was otherwise negative for CF, pancreatitis, infertility, and known genetic conditions.    Cheo notes  ancestry. Known consanguinity was  denied.     Discussion:   Genes are long stretches of DNA that are responsible for how our bodies look and how our bodies work. We all have two copies of every gene, one inherited from our mother and one inherited from our father. When there is a change, called a mutation, in a gene it can cause it to not do its job correctly which can cause the signs and symptoms of a genetic condition.      Classical cystic fibrosis (CF) causes a person to produce thick, sticky mucus due to an imbalance of salt and water in and out of the cells. This affects the lungs, sinuses, digestive system, and male reproductive system, among other areas of the body. Children who are diagnosed with classical cystic fibrosis need respiratory therapy several times a day, and may need to take pills to help their body to digest food. However, it is important to remember that there can be great variation in the symptoms that a person may have. There is great variability in symptoms in individuals with CF, where some individuals have very mild symptoms or are only diagnosed in adulthood as a result of an infertility work-up, while others have significant symptoms as infants and are diagnosed soon after birth. We can think of this as a spectrum. Genetic testing, sweat chloride testing, and someone's personal and family history all can help assess where someone may be in this spectrum, though we are not able to predict exactly what someone's presentation may look like or what exact symptoms they will develop when.     CFTR disorders including cystic fibrosis are caused by mutations in a gene called CFTR.  CFTR disorders are inherited in an autosomal recessive pattern.  This means that to be affected an individual must inherit a mutation in both copies of the CFTR gene (one from each parent). These exact mutations can be more severe or mild, which can affect presentation/severity for an individual.      Individuals with just one mutation in the CFTR  gene are said to be carriers.  Carriers do not have cystic fibrosis but can have a child with CF if their partner is also a carrier or has a CFTR disorder themselves.  When two parents are carriers, then with each pregnancy together, there is a 25% chance to have a child with cystic fibrosis, a 50% chance to have a child that is an unaffected carrier, and a 25% chance to have a child that is an unaffected non-carrier. No one has control over which copy they pass on to their child since it is a random process. These exact chances change if one parent personally has a CFTR disorder.     While most people with two CFTR mutations have classic cystic fibrosis, CFTR mutations can also cause milder CFTR disorders. The diagnosis of cystic fibrosis is made by a sweat chloride test of >60 mmol/L (normal range <30 mmol/L), and/or the presence of two severe CF-causing mutations in the CFTR gene.  A diagnosis of a CFTR-related metabolic syndrome (CRMS) is made when an individual has a positive  screen for CF, but at least one of their mutations is not classified as severe CF-causing (or genetic testing is inconclusive) and their sweat chloride test is <60 mmol/L.      Past Genetic Testing:  Cheo's previous genetic testing found a 5T-TG12 mutation and a variant of unknown significance p.[id number] (aka c.4028G>T) in CFTR. The numbers and letters in this result stand for exactly where in the gene the genetic change is located and what change was found.    The 5T change or mutation references a part of the CFTR gene. This portion of the CFTR gene is called the Poly-T tract. If one thinks of the genetic alphabet using letters A, C, T, and G, it is an area of the gene that has many T s in a row. If there are 5T repeats, this can act as a mild or possibly moderate CF mutation. In combination with another CF mutation, one inherited from each parent, this can potentially cause symptoms of CF. However, there is much variability seen  "between individuals, and we are not able to predict the symptoms, if any, individuals with this genetic combination might have.     The \"TG12  portions of the 5T-TG12 mutation also refers to a portion of the CFTR gene that has several  TG  s in a row. Every person has this section of the CFTR gene, and it is usually seen as 10TG, 11TG, 12TG, or 13TG. A person with 12TG or 13TG along with a 5T is known to be more likely to develop non-classic CF than a person with 10TG or 11TG along with a 5T.     The  p.N0150M mutation is a very rare mutation, and currently classified as a variant of unknown significance. It is unknown if this VUS is truly pathogenic or benign at this time. A variant of unknown significance (VUS) is a change in the DNA sequence of a particular gene where it is not known if it causes the gene to not work correctly/could cause a condition, or if it is normal benign variation that does not affect the gene. Usually more research/evidence over time is needed to determine a VUS's significance. It is certainly possible that at some point in the future more could be known about this variant and it could be reclassified. Even if this mutation is pathogenic, it is unknown at this time if it is a severe or mild CFTR mutation.    Sweat Chloride Test Results:  A sweat chloride test is a test used to diagnose an individual with cystic fibrosis. The possible results of the sweat test were reviewed briefly, including a positive, negative, or borderline result. A positive sweat chloride test is consistent with a diagnosis of cystic fibrosis.     Cheo's sweat test today returned positive at 67 and 69 mmol/L (normal range <30 mmol/L, diagnostic for CF >60 mmol/L). This is consistent with a diagnosis of cystic fibrosis for Cheo. We discussed that this diagnosis + Cheo's genetic testing (1 variable mutation and 1 VUS) do not clarify which symptoms he may ever develop and when. Individuals with CF with at least one " mild CFTR mutation can vary widely in symptoms (if any) and severity, though often do not have classic CF presentation.  It is certainly possible that CF at least plays a role in Cheo's pancreatitis. The fact that Cheo has done well other than pancreatitis is reassuring, but does not rule out other CF symptoms and establishing care with an adult CF provider would be recommended for Cheo. I will have the team call him to schedule this. Management would be discussed further at that time.    Given Cheo's positive sweat test, we discussed that we may be more suspicious that the p.T8415L VUS is a true CFTR mutation, though additional information is needed. I will ask [facility] if his positive sweat test would change this variant's classification or not, and if additional information is known about this variant currently. I will follow up with Cheo if additional information is known now, otherwise the family could check in about variant reclassification periodically.     We understand that this diagnosis and appointment can be a lot of information at once. We continue to be a resource for the family as they process this information and next steps. One good resource is the Cystic Fibrosis Foundation: https://www.cff.org/Life-With-CF/Daily-Life/Adult-Guide-to-Cystic-Fibrosis/. If other resources are helpful (medical, support, or both) I am happy to help assist with this at any time. My number was given today for any questions or requests.     Family Members:  Assuming Cheo has a mutation on each of his CFTR genes, no matter which copy he passes on, his children will inherit a mutation. Whether or not they have a CFTR disorder or are just a carrier depends on whether or not his partner was a carrier. If they are a carrier (like Jo), with each pregnancy there would be a 50% chance for a child to have a CFTR disorder and a 50% chance for a child to be only a carrier. Predicted presentation/severity of a CFTR  disorder depends on exact mutations an individual inherits. If a partner was not a carrier, chances for children to have a CFTR disorder would be low. [facility] with previous genetic counseling. Pre and  testing options for any future children were not reviewed today, but can be in the future if helpful. Lastly, Cheo's daughter would be expected to be at least a CF carrier (depending on if her mother was a carrier), and genetic counseling would be available to her (and her partner) when older and potentially thinking of starting a family, or sooner if she had any CF concerns.     Other asymptomatic family members also have an increased chance to be a carrier for CF and it is important for this information be shared with extended family. Specifically, we normally do not recommend familial testing for a VUS as we are unable to interpret its significance. However, familial carrier testing for the 5T mutation (and the VUS if later reclassified) and/or genetic counseling to discuss reproductive information for family members would be available if helpful. If another extended family member is found to be a carrier for CF, their partner could be tested to determine if he or she is also a carrier. Carrier testing can be pursued here at [facility] by calling our direct number, or by asking a PCP for a genetic counseling referral locally.     Lastly, Cheo's siblings could also have a CFTR-related disorder. A sweat test and genetics appointment to discuss the family history of CF would be recommended for siblings to assess if they have CF and subsequent management for them, especially if they have any concerning symptoms. We are happy to see additional family members at [facility] if interested. I am also happy to write a family letter to share the above information and recommendations with siblings if helpful.       It was a pleasure to meet with Cheo hercules  today. He had no additional questions at this time. Contact information was shared for any future questions or concerns that arise.     Plan:   1. Cheo's sweat chloride test was positive today. This is consistent with a diagnosis of cystic fibrosis.  2. Cheo will be contacted about setting up an adult CF clinic appointment here given this diagnosis. Follow up as recommended by this provider.  3. I will ask [facility] about reclassification of his VUS, and follow up with Cheo if new information is known or variant classification changes.  4. I will see if the team can help place a GI referral for Cheo given his history of pancreatitis and new CF diagnosis.  5. Contact information was provided.        Ramona Langston MS, [facility]  Genetic Counselor  Division of Genetics and Metabolism  [facility]   Phone: [phone]  Pager: [phone]       CC: Patient, PCP, Dr. Alamo    Approx time spent phone: 57 minutes

## 2021-02-22 ENCOUNTER — DOCUMENTATION ONLY (OUTPATIENT)
Dept: PULMONOLOGY | Facility: CLINIC | Age: 36
End: 2021-02-22

## 2021-02-22 NOTE — TELEPHONE ENCOUNTER
[facility] updated me that the CFTR p.F6256P (c.4028G>T) variant is still currently classified as a variant of uncertain significance.     Updated clinic notes for Cheo were emailed to their team so the variant could be reviewed at this time with Cheo's new diagnosis of cystic fibrosis. They will reach out to me when this review is complete, and I will update Cheo and his care team if the variant is reclassified. Otherwise if it is still a VUS, the family and/or care team are welcome to reach out to me periodically to assess the variant's current classification with [facility].      Ramona Langston MS, [facility]  Genetic Counseling  Genetics and Cystic Fibrosis Division  [facility]   Phone Number: [phone]  Pager: [phone]  Email: [email or web address]

## 2021-04-21 NOTE — PROGRESS NOTES
"Cheo is a 35 year old who is being evaluated via a billable video visit.      How would you like to obtain your AVS? [email or web address]  If the video visit is dropped, the invitation should be resent by: Other e-mail: [email or web address]  Will anyone else be joining your video visit? No      Video Start Time: 4:50pm  Video-Visit Details    Type of service:  Video Visit    Video End Time:5:03 PM    Originating Location (pt. Location): Home    Distant Location (provider location):  [facility]     Platform used for Video Visit: [employer]  Reason for Visit  Cheo Padron is a 35 year old male who is being seen for No chief complaint on file.    CF HPI  The patient was seen and examined by Presley Alamo MD   Cheo Padron is a 35 year old male  with HTN, hyperlipidemia, Mantoux positive, CHF and premature - CAD. He is a smoker. He is being seen today after w/u for pancreatitis and has a child with CF. H was worked up and it showed positive sweat test.    He was admitted in 7/2020 with alcohol induced pancreatitis.     His symptoms started in December 2019.  It started off with abdominal pain and nausea vomiting.  It lasted for about 3 to 4 days and then it resolved.  This was preceded by binge drinking.  He used to drink 3-4 drinks every day and sometimes on weekends up to 14-15 drinks.  The pain resolved and he was doing well up until February 2020.  This pain recurred along with nausea and vomiting.  This again lasted for a few days and resolved.  This also was associated with binge drinking episode.    The symptoms recurred in July 2020 at which time he did go to the emergency room to have it evaluated.  He was diagnosed with pancreatitis.  Since his hospital stay the pain in his abdomen is slowly improved.  He is now having about a level 3 pain on and off throughout the day.  He notices the pain is worse especially when he eats \"heavy meaty meal\".  The pain is of mild " crampy quality most of the time.  And when he eats a heavy meal is the pain is more worse and it last longer.  He is taken to eating light foods mostly salads.  He has lost about 10 pounds in the last 6 months.  He does not having any nausea or vomiting at this time.  Does not have any constipation or loose stools.  He is taking up to 2-3 Tylenols per day to manage the pain.  He has also taken ibuprofen to manage the pain.    He has stopped drinking for good.  Interval history:   Abd pain has resolved. Eats one big meal/day.  No more nausea/vomiting. No constipation/loose stools. No fat in stools.   No Cough/SOB/CP.     Current Outpatient Medications   Medication     acetaminophen (TYLENOL) 325 MG tablet     aspirin (ASA) 81 MG EC tablet     atorvastatin (LIPITOR) 80 MG tablet     carvedilol (COREG) 6.25 MG tablet     fexofenadine (ALLEGRA) 180 MG tablet     ibuprofen (ADVIL/MOTRIN) 400 MG tablet     lisinopril (ZESTRIL) 5 MG tablet     No current facility-administered medications for this visit.      Allergies   Allergen Reactions     Enoxaparin Hives     Urticaria started 4 days after starting lovenox.    Unclear if rash due to lovenox but this seems most likely today (7/9/2012).    One week ago started metoprolol, asa, plavix, lisinopril, simvastatin, and spironolactone.       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     ST elevation myocardial infarction involving left anterior descending (LAD) coronary artery (H) 2012       Past Surgical History:   Procedure Laterality Date     CT CORONARY ANGIOGRAM  07/01/2012    Has two drug eluting stents       Social History     Socioeconomic History     Marital status: Single     Spouse name: Not on file     Number of children: Not on file     Years of education: Not on file      Highest education level: Not on file   Occupational History     Not on file   Social Needs     Financial resource strain: Not on file     Food insecurity     Worry: Not on file     Inability: Not on file     Transportation needs     Medical: Not on file     Non-medical: Not on file   Tobacco Use     Smoking status: Current Every Day Smoker     Types: Cigarettes     Smokeless tobacco: Never Used   Substance and Sexual Activity     Alcohol use: Not Currently     Drug use: Not Currently     Sexual activity: Not on file   Lifestyle     Physical activity     Days per week: Not on file     Minutes per session: Not on file     Stress: Not on file   Relationships     Social connections     Talks on phone: Not on file     Gets together: Not on file     Attends Voodoo service: Not on file     Active member of club or organization: Not on file     Attends meetings of clubs or organizations: Not on file     Relationship status: Not on file     Intimate partner violence     Fear of current or ex partner: Not on file     Emotionally abused: Not on file     Physically abused: Not on file     Forced sexual activity: Not on file   Other Topics Concern     Parent/sibling w/ CABG, MI or angioplasty before 65F 55M? Not Asked   Social History Narrative     Not on file       ROS Pulmonary  Constitutional- Denies weight loss, has not gained weight. No F/C/S.  Eyes- Negative  Ear, nose and throat- Has allergies in the last couple weeks.  This year it is better.  Cardiac- Negative  Pulm- Negative  GI- See HPI. No Heartburn.   Genitourinary- Negative  Musculoskeletal- Negative  Neurology- Negative  Dermatology- Negative  Endocrine- Negative  Lymphatic- Negative  Psychiatry- Negative    A complete ROS was otherwise negative except as noted in the HPI.  There were no vitals taken for this visit.   Exam:   GENERAL APPEARANCE: Well developed, well nourished, alert, and in no apparent distress.  EYES: PERRL, EOMI  HENT: Nasal mucosa with no  "edema and no hyperemia. No nasal polyps.  EARS: Both Ear canals full of \"Wax\", TMs normal  MOUTH: Oral mucosa is moist, without any lesions, no tonsillar enlargement, no oropharyngeal exudate.  NECK: supple, no masses, no thyromegaly.  LYMPHATICS: No significant axillary, cervical, or supraclavicular nodes.  RESP: normal percussion, good air flow throughout.  No crackles. No rhonchi. No wheezes.  CV: Normal S1, S2, regular rhythm, normal rate. No murmur.  No rub. No gallop. No LE edema.   ABDOMEN:  Bowel sounds normal, soft, nontender, no HSM or masses.   MS: extremities normal. No clubbing. No cyanosis.  SKIN: no rash on limited exam  NEURO: Mentation intact, speech normal, normal strength and tone, normal gait and stance  PSYCH: mentation appears normal. and affect normal/bright.    Results:  No results found for this or any previous visit (from the past 168 hour(s)).      Results as noted above.    PFT Interpretation:  Normal spirometry.  Valid Maneuver      Assessment and plan: Cheo Padron is a 35 year old male with HTN, hyperlipidemia, CHF and premature - CAD. He is a smoker. He is being seen today after w/u for pancreatitis showed positive sweat test.    #. CF vs. CFTR disorder: He has essentially normal lung function with no pulmonary symptoms. His CF sweat chloride tests were positive.    CF genetics: 5T-TG12 plus a VUS ([id number]).   Will have CF  weigh in on this.  Last CXR from 9/14/2016 per report did not show any bronchiectasis.  4/27/2021: We will obtain Spirometry next visit.    #. Pancreatitis: His first episode was on 7/2012 requiring hospitalization. He has had atleast two other episodes prior to that. it was felt to be ETOH induced, it is difficult to r/o  or rule in CF playing a role. His ongoing sx while not typically raise the question of chronic pancreatitis.   - We will try enzymes to see if this will help.  - Consult Dr. Beth (GI)    #. Premature CAD: Followed by Dr. Rothman. " He had presented with STEMI on 7/1/2012. This required revasc with drug eluting stents x2 from prox to mid LAD.   - currently on Aspirin.    #. Ischemic CMP: His last MRI in 7/2012 was 41% with large WMA consistent LAD distribution infarction. Last Echo on 7/2013 showed an EF of 40 - 45% with akinesis of the antr and anteroseptal walls from base to apex. No significant valvular disease.  Last Stress test on 5/2015  was neg with 12.8 METS achieved    #. HTN: BP well controlled on Carvedilol and ACEI.    #. Hypertriglyceridemia: He is on Statins.     #.Vitamin D deficiency: Advised to take Vit D regularly.    #. Ongoing Tobacco abuse: He has attempted to quit smoking unsuccessfully and would like to quit. He has Chantix at home. We will continue to encourage him.  4/27/2021: Still smoking 5 - 6 cigs/day.    #. HCM: He is receiving COVID19 vaccination.    Presley Alamo MD.  Pulmonary and Critical Care.  04/21/2021  10:02 AM

## 2021-04-27 ENCOUNTER — VIRTUAL VISIT (OUTPATIENT)
Dept: PULMONOLOGY | Facility: CLINIC | Age: 36
End: 2021-04-27
Attending: INTERNAL MEDICINE
Payer: COMMERCIAL

## 2021-04-27 DIAGNOSIS — E84.9 CYSTIC FIBROSIS (H): Primary | ICD-10-CM

## 2021-04-27 PROCEDURE — 99213 OFFICE O/P EST LOW 20 MIN: CPT | Mod: 95 | Performed by: INTERNAL MEDICINE

## 2021-04-27 NOTE — LETTER
Date:June 9, 2021      Patient was self referred, no letter generated. Do not send.        [facility] Health Information

## 2021-04-27 NOTE — LETTER
4/27/2021         RE: Cheo Padron  [address] [place] [address]        Dear Colleague,    Thank you for referring your patient, Cheo Padron, to the [facility]. Please see a copy of my visit note below.    Cheo is a 35 year old who is being evaluated via a billable video visit.      How would you like to obtain your AVS? [employer]  If the video visit is dropped, the invitation should be resent by: Other e-mail: [email or web address]  Will anyone else be joining your video visit? No      Video Start Time: 4:50pm  Video-Visit Details    Type of service:  Video Visit    Video End Time:5:03 PM    Originating Location (pt. Location): Home    Distant Location (provider location):  [facility]     Platform used for Video Visit: Zylie the Bear  Reason for Visit  Cheo Padron is a 35 year old male who is being seen for No chief complaint on file.    CF HPI  The patient was seen and examined by Presley Alamo MD   Cheo Padron is a 35 year old male  with HTN, hyperlipidemia, Mantoux positive, CHF and premature - CAD. He is a smoker. He is being seen today after w/u for pancreatitis and has a child with CF. H was worked up and it showed positive sweat test.    He was admitted in 7/2020 with alcohol induced pancreatitis.     His symptoms started in December 2019.  It started off with abdominal pain and nausea vomiting.  It lasted for about 3 to 4 days and then it resolved.  This was preceded by binge drinking.  He used to drink 3-4 drinks every day and sometimes on weekends up to 14-15 drinks.  The pain resolved and he was doing well up until February 2020.  This pain recurred along with nausea and vomiting.  This again lasted for a few days and resolved.  This also was associated with binge drinking episode.    The symptoms recurred in July 2020 at which time he did go to the emergency room  "to have it evaluated.  He was diagnosed with pancreatitis.  Since his hospital stay the pain in his abdomen is slowly improved.  He is now having about a level 3 pain on and off throughout the day.  He notices the pain is worse especially when he eats \"heavy meaty meal\".  The pain is of mild crampy quality most of the time.  And when he eats a heavy meal is the pain is more worse and it last longer.  He is taken to eating light foods mostly salads.  He has lost about 10 pounds in the last 6 months.  He does not having any nausea or vomiting at this time.  Does not have any constipation or loose stools.  He is taking up to 2-3 Tylenols per day to manage the pain.  He has also taken ibuprofen to manage the pain.    He has stopped drinking for good.  Interval history:   Abd pain has resolved. Eats one big meal/day.  No more nausea/vomiting. No constipation/loose stools. No fat in stools.   No Cough/SOB/CP.     Current Outpatient Medications   Medication     acetaminophen (TYLENOL) 325 MG tablet     aspirin (ASA) 81 MG EC tablet     atorvastatin (LIPITOR) 80 MG tablet     carvedilol (COREG) 6.25 MG tablet     fexofenadine (ALLEGRA) 180 MG tablet     ibuprofen (ADVIL/MOTRIN) 400 MG tablet     lisinopril (ZESTRIL) 5 MG tablet     No current facility-administered medications for this visit.      Allergies   Allergen Reactions     Enoxaparin Hives     Urticaria started 4 days after starting lovenox.    Unclear if rash due to lovenox but this seems most likely today (7/9/2012).    One week ago started metoprolol, asa, plavix, lisinopril, simvastatin, and spironolactone.       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     ST elevation myocardial infarction involving left anterior descending (LAD) coronary artery (H) 2012       Past " Surgical History:   Procedure Laterality Date     CT CORONARY ANGIOGRAM  07/01/2012    Has two drug eluting stents       Social History     Socioeconomic History     Marital status: Single     Spouse name: Not on file     Number of children: Not on file     Years of education: Not on file     Highest education level: Not on file   Occupational History     Not on file   Social Needs     Financial resource strain: Not on file     Food insecurity     Worry: Not on file     Inability: Not on file     Transportation needs     Medical: Not on file     Non-medical: Not on file   Tobacco Use     Smoking status: Current Every Day Smoker     Types: Cigarettes     Smokeless tobacco: Never Used   Substance and Sexual Activity     Alcohol use: Not Currently     Drug use: Not Currently     Sexual activity: Not on file   Lifestyle     Physical activity     Days per week: Not on file     Minutes per session: Not on file     Stress: Not on file   Relationships     Social connections     Talks on phone: Not on file     Gets together: Not on file     Attends Jehovah's witness service: Not on file     Active member of club or organization: Not on file     Attends meetings of clubs or organizations: Not on file     Relationship status: Not on file     Intimate partner violence     Fear of current or ex partner: Not on file     Emotionally abused: Not on file     Physically abused: Not on file     Forced sexual activity: Not on file   Other Topics Concern     Parent/sibling w/ CABG, MI or angioplasty before 65F 55M? Not Asked   Social History Narrative     Not on file       ROS Pulmonary  Constitutional- Denies weight loss, has not gained weight. No F/C/S.  Eyes- Negative  Ear, nose and throat- Has allergies in the last couple weeks.  This year it is better.  Cardiac- Negative  Pulm- Negative  GI- See HPI. No Heartburn.   Genitourinary- Negative  Musculoskeletal- Negative  Neurology- Negative  Dermatology- Negative  Endocrine-  "Negative  Lymphatic- Negative  Psychiatry- Negative    A complete ROS was otherwise negative except as noted in the HPI.  There were no vitals taken for this visit.   Exam:   GENERAL APPEARANCE: Well developed, well nourished, alert, and in no apparent distress.  EYES: PERRL, EOMI  HENT: Nasal mucosa with no edema and no hyperemia. No nasal polyps.  EARS: Both Ear canals full of \"Wax\", TMs normal  MOUTH: Oral mucosa is moist, without any lesions, no tonsillar enlargement, no oropharyngeal exudate.  NECK: supple, no masses, no thyromegaly.  LYMPHATICS: No significant axillary, cervical, or supraclavicular nodes.  RESP: normal percussion, good air flow throughout.  No crackles. No rhonchi. No wheezes.  CV: Normal S1, S2, regular rhythm, normal rate. No murmur.  No rub. No gallop. No LE edema.   ABDOMEN:  Bowel sounds normal, soft, nontender, no HSM or masses.   MS: extremities normal. No clubbing. No cyanosis.  SKIN: no rash on limited exam  NEURO: Mentation intact, speech normal, normal strength and tone, normal gait and stance  PSYCH: mentation appears normal. and affect normal/bright.    Results:  No results found for this or any previous visit (from the past 168 hour(s)).      Results as noted above.    PFT Interpretation:  Normal spirometry.  Valid Maneuver      Assessment and plan: Cheo Padron is a 35 year old male with HTN, hyperlipidemia, CHF and premature - CAD. He is a smoker. He is being seen today after w/u for pancreatitis showed positive sweat test.    #. CF vs. CFTR disorder: He has essentially normal lung function with no pulmonary symptoms. His CF sweat chloride tests were positive.    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   Will have CF  weigh in on this.  Last CXR from 9/14/2016 per report did not show any bronchiectasis.  4/27/2021: We will obtain Spirometry next visit.    #. Pancreatitis: His first episode was on 7/2012 requiring hospitalization. He has had atleast two other " episodes prior to that. it was felt to be ETOH induced, it is difficult to r/o  or rule in CF playing a role. His ongoing sx while not typically raise the question of chronic pancreatitis.   - We will try enzymes to see if this will help.  - Consult Dr. Beth (GI)    #. Premature CAD: Followed by Dr. Rothman. He had presented with STEMI on 7/1/2012. This required revasc with drug eluting stents x2 from prox to mid LAD.   - currently on Aspirin.    #. Ischemic CMP: His last MRI in 7/2012 was 41% with large WMA consistent LAD distribution infarction. Last Echo on 7/2013 showed an EF of 40 - 45% with akinesis of the antr and anteroseptal walls from base to apex. No significant valvular disease.  Last Stress test on 5/2015  was neg with 12.8 METS achieved    #. HTN: BP well controlled on Carvedilol and ACEI.    #. Hypertriglyceridemia: He is on Statins.     #.Vitamin D deficiency: Advised to take Vit D regularly.    #. Ongoing Tobacco abuse: He has attempted to quit smoking unsuccessfully and would like to quit. He has Chantix at home. We will continue to encourage him.  4/27/2021: Still smoking 5 - 6 cigs/day.    #. HCM: He is receiving COVID19 vaccination.    Presley Alamo MD.  Pulmonary and Critical Care.  04/21/2021  10:02 AM         Again, thank you for allowing me to participate in the care of your patient.        Sincerely,        Presley Alamo MD

## 2021-04-27 NOTE — PATIENT INSTRUCTIONS
Cystic Fibrosis Self-Care Plan    RECOMMENDATIONS:   1. Please take Vitamin D (1000units or 25micrograms) daily.        [facility] Nurse line:  JITENDRA Coronel lizette Spenceyla [phone]     [facility] Fax Number:      [phone]         Cystic Fibrosis Respiratory Therapists:   Chikis Ren              [phone]          Emily Zhong   [phone]  Cystic Fibrosis Dietitians:              Tanisha Hurtado              [phone]                            Paris Andino                        [phone]   Cystic Fibrosis Diabetes Nurse:    Etelvina Domínguez   [phone]    Cystic Fibrosis Social Workers:     Sarah Mccarthy               [phone]                     Tamra Watson               [phone]  Cystic Fibrosis Pharmacists:           Jennifer Dykes                               [phone]         Melanie Dewitt   [phone]  Cystic Fibrosis Genetic Counselor:   Aleshia Arenas    [phone]    [facility] website:  [email or web address]    COVID VACCINES:    You are eligible for the COVID-19 vaccine. Sign up for your COVID vaccine via [employer]. Log in, select the menu bar, select schedule an appointment, and then select COVID-19 Vaccine 1st Dose. You may also schedule by calling this number [phone] however hold times can be long.       OR schedule through the [facility] of Health Vaccine Connector at https://vaccineconnector.[place].gov/ or by calling [phone].      The best vaccine is the one that s available to you first.  All COVID-19 vaccines currently available in the [place] (Jhon & Jhon, Pfizer and Moderna) have been shown to be highly effect at preventing COVID-19.       We re still learning how vaccines will affect the spread of COVID-19. After you ve been fully vaccinated against COVID-19, you should keep taking precautions in public places like wearing a mask, staying 6 feet apart from others, and avoiding  crowds and poorly ventilated spaces until we know more.    People are considered fully vaccinated:  2 weeks after their second dose in a 2-dose series, such as the Pfizer or Moderna vaccines, or  2 weeks after a single-dose vaccine, such as Jhon & Jhon s Jennifer vaccine    If you ve been fully vaccinated:  You can gather indoors with fully vaccinated people without wearing a mask.  You can gather indoors with unvaccinated people from one other household (for example, visiting with relatives who all live together) without masks, unless any of those people or anyone they live with has an increased risk for severe illness from COVID-19.  If you ve been around someone who has COVID-19, you do not need to stay away from others or get tested unless you have symptoms.  However, if you live in a group setting (like a correctional or [facility] facility or group home) and are around someone who has COVID-19, you should still stay away from others for 14 days and get tested, even if you don t have symptoms.         MRN: [id number]   Clinic Date: April 27, 2021   Patient: Cheo Padron     Annual Studies:   IGG   Date Value Ref Range Status   12/28/2020 1,499 610 - 1,616 mg/dL Final     No results found for: INS  There are no preventive care reminders to display for this patient.    Pulmonary Function Tests  FEV1: amount of air you can blow out in 1 second  FVC: total amount of air you can take in and blow out    Your Goals:         PFT Latest Ref Rng & Units 12/28/2020   FVC L 3.67   FEV1 L 3.13   FVC% % 82   FEV1% % 84          Airway Clearance: The Most Important Way to Keep Your Lungs Healthy  Vest Settings:    Hill-Rom Frequencies: 8, 9, 10 Pressure 10 Then, Frequencies 18, 19, 20 Pressure 6      RespirTech: Quick Start with Pressure of     Do each frequency for 5 minutes; Deflate vest after each frequency & cough 3 times before beginning the next setting.    Vest and Neb Therapy should be done 0  times/day.    Good Nutrition Can Improve Lung Function and Overall Health     Take ALL of your vitamins with food     Take 1/2 of your enzymes before EVERY meal/snack and the other 1/2 mid-meal/snack    Wt Readings from Last 3 Encounters:   12/28/20 66.2 kg (146 lb)       There is no height or weight on file to calculate BMI.         National CF Foundation Recommendations for BMI in CF Adults: Women: at least 22 Men: at least 23        Controlling Blood Sugars Helps Prevent Lung Infections & Improves Nutrition  Test blood sugar:     In the morning before eating (goal is )     2 hours after a meal (goal is less than 150)     When pre-meal glucose is greater than 150 add correction     At bedtime (if less than 100 eat a snack with 15 grams of carbohydrates  Last A1C Results:   Hemoglobin A1C   Date Value Ref Range Status   12/28/2020 5.1 0 - 5.6 % Final     Comment:     Normal <5.7% Prediabetes 5.7-6.4%  Diabetes 6.5% or higher - adopted from ADA   consensus guidelines.           If diabetic, measure A1C every 6 months. Goal: Under 7%    Staying Healthy    Research:  If you are interested in learning about research opportunities or have questions, please contact the CF Research Team at [phone] or [email or web address].      CF Foundation:  Compass is a personalized resource service to help you with the insurance, financial, legal and other issues you are facing.  It's free, confidential and available to anyone with CF.  Ask your  for more information or contact Compass directly at 808-COMPASS ([phone]) or compass@cff.org, or learn more at cff.[email or web address].

## 2021-07-14 NOTE — PROGRESS NOTES
"Reason for Visit  Cheo Padron is a 36year old male who is being seen for RECHECK (Return cystic fibrosis )    CF HPI  The patient was seen and examined by Presley Alamo MD   Cheo Padron is a 36 year old male  with HTN, hyperlipidemia, Mantoux positive, CHF and premature - CAD. He is a smoker. He was after w/u for pancreatitis and has a child with CF. H was worked up and it showed positive sweat test and diagnosed with CF in 2020.    He was admitted in 7/2020 with alcohol induced pancreatitis.     His symptoms started in December 2019.  It started off with abdominal pain and nausea vomiting.  It lasted for about 3 to 4 days and then it resolved.  This was preceded by binge drinking.  He used to drink 3-4 drinks every day and sometimes on weekends up to 14-15 drinks.  The pain resolved and he was doing well up until February 2020.  This pain recurred along with nausea and vomiting.  This again lasted for a few days and resolved.  This also was associated with binge drinking episode.    The symptoms recurred in July 2020 at which time he did go to the emergency room to have it evaluated.  He was diagnosed with pancreatitis.  Since his hospital stay the pain in his abdomen is slowly improved.  He is now having about a level 3 pain on and off throughout the day.  He notices the pain is worse especially when he eats \"heavy meaty meal\".  The pain is of mild crampy quality most of the time.  And when he eats a heavy meal is the pain is more worse and it last longer.  He is taken to eating light foods mostly salads.  He has lost about 10 pounds in the last 6 months.  He does not having any nausea or vomiting at this time.  Does not have any constipation or loose stools.  He is taking up to 2-3 Tylenols per day to manage the pain.  He has also taken ibuprofen to manage the pain.    He has stopped drinking for good.  Interval history:   Abd pain/fullness and emesis still persist. Seen by GI. Eats one big " meal/day.  No constipation/loose stools. No fat in stools.   No Cough/SOB/CP.     Current Outpatient Medications   Medication     acetaminophen (TYLENOL) 325 MG tablet     amoxicillin (AMOXIL) 500 MG tablet     aspirin (ASA) 81 MG EC tablet     atorvastatin (LIPITOR) 80 MG tablet     carvedilol (COREG) 6.25 MG tablet     fexofenadine (ALLEGRA) 180 MG tablet     ibuprofen (ADVIL/MOTRIN) 400 MG tablet     lisinopril (ZESTRIL) 5 MG tablet     No current facility-administered medications for this visit.     Allergies   Allergen Reactions     Enoxaparin Hives     Urticaria started 4 days after starting lovenox.    Unclear if rash due to lovenox but this seems most likely today (7/9/2012).    One week ago started metoprolol, asa, plavix, lisinopril, simvastatin, and spironolactone.       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     ST elevation myocardial infarction involving left anterior descending (LAD) coronary artery (H) 2012       Past Surgical History:   Procedure Laterality Date     CT CORONARY ANGIOGRAM  07/01/2012    Has two drug eluting stents       Social History     Socioeconomic History     Marital status: Single     Spouse name: Not on file     Number of children: Not on file     Years of education: Not on file     Highest education level: Not on file   Occupational History     Not on file   Tobacco Use     Smoking status: Current Every Day Smoker     Types: Cigarettes     Smokeless tobacco: Never Used   Substance and Sexual Activity     Alcohol use: Not Currently     Drug use: Not Currently     Sexual activity: Not on file   Other Topics Concern     Parent/sibling w/ CABG, MI or angioplasty before 65F 55M? Not Asked   Social History Narrative     Not on file     Social Determinants of Health     Financial Resource  "Strain:      Difficulty of Paying Living Expenses:    Food Insecurity:      Worried About Running Out of Food in the Last Year:      Ran Out of Food in the Last Year:    Transportation Needs:      Lack of Transportation (Medical):      Lack of Transportation (Non-Medical):    Physical Activity:      Days of Exercise per Week:      Minutes of Exercise per Session:    Stress:      Feeling of Stress :    Social Connections:      Frequency of Communication with Friends and Family:      Frequency of Social Gatherings with Friends and Family:      Attends Denominational Services:      Active Member of Clubs or Organizations:      Attends Club or Organization Meetings:      Marital Status:    Intimate Partner Violence:      Fear of Current or Ex-Partner:      Emotionally Abused:      Physically Abused:      Sexually Abused:        ROS Pulmonary  Constitutional- Denies weight loss, has not gained weight. No F/C/S.  Eyes- Negative  Ear, nose and throat- Has allergies in the last couple weeks.  This year it is better.  Cardiac- Negative  Pulm- Negative  GI- See HPI. No Heartburn.   Genitourinary- Negative  Musculoskeletal- Negative  Neurology- Negative  Dermatology- Negative  Endocrine- Negative  Lymphatic- Negative  Psychiatry- Negative    A complete ROS was otherwise negative except as noted in the HPI.  /83   Pulse 86   Resp 17   Ht 1.702 m (5' 7\")   Wt 64 kg (141 lb 1.5 oz)   SpO2 100%   BMI 22.10 kg/m     Exam:   GENERAL APPEARANCE: Well developed, well nourished, alert, and in no apparent distress.  EYES: PERRL, EOMI  HENT: Nasal mucosa with no edema and no hyperemia. No nasal polyps.  EARS: Both Ear canals full of \"Wax\", TMs normal  MOUTH: Oral mucosa is moist, without any lesions, no tonsillar enlargement, no oropharyngeal exudate.  NECK: supple, no masses, no thyromegaly.  LYMPHATICS: No significant axillary, cervical, or supraclavicular nodes.  RESP: normal percussion, good air flow throughout.  No crackles. " No rhonchi. No wheezes.  CV: Normal S1, S2, regular rhythm, normal rate. No murmur.  No rub. No gallop. No LE edema.   ABDOMEN:  Bowel sounds normal, soft, nontender, no HSM or masses.   MS: extremities normal. No clubbing. No cyanosis.  SKIN: no rash on limited exam  NEURO: Mentation intact, speech normal, normal strength and tone, normal gait and stance  PSYCH: mentation appears normal. and affect normal/bright.    Results:  Recent Results (from the past 168 hour(s))   General PFT Lab (Please always keep checked)    Collection Time: 07/19/21  9:58 AM   Result Value Ref Range    FVC-Pred 4.45 L    FVC-Pre 3.88 L    FVC-%Pred-Pre 87 %    FEV1-Pre 3.40 L    FEV1-%Pred-Pre 92 %    FEV1FVC-Pred 83 %    FEV1FVC-Pre 88 %    FEFMax-Pred 9.63 L/sec    FEFMax-Pre 8.27 L/sec    FEFMax-%Pred-Pre 85 %    FEF2575-Pred 3.77 L/sec    FEF2575-Pre 4.54 L/sec    VAS4864-%Pred-Pre 120 %    ExpTime-Pre 4.68 sec    FIFMax-Pre 5.58 L/sec    FEV1FEV6-Pred 82 %    FEV1FEV6-Pre 88 %         Results as noted above.    PFT Interpretation:  Normal spirometry.  FEV1 improved c/w previous by 270ml.  BEST FEV1 todate  Valid Maneuver      Assessment and plan: Cheo Padron is a 36 year old male with HTN, hyperlipidemia, CHF and premature - CAD. He is a smoker. He is being seen today for CF follow up.     #. CF vs. CFTR disorder: He has essentially normal lung function with no pulmonary symptoms. His CF sweat chloride tests were positive.    CF genetics: 5T-TG12 plus a VUS ([id number]).   Will have CF  weigh in on this.  Last CXR from 9/14/2016 per report did not show any bronchiectasis.  7/19/2021: Pulm sx stable. We will obtain Spirometry next visit.    CF exacerbation: Absent    #. Recurrent Acute Pancreatitis: His first episode was on 7/2012 requiring hospitalization. He has had atleast two other episodes prior to that. it was felt to be ETOH induced, it is difficult to r/o  or rule in CF playing a role. His ongoing sx while not  typically raise the question of chronic pancreatitis.   7/19/2021: Our Dietitian has not been able to contact.   - Appreciate Dr. Beth (GI)/Dr. Davalos input.   - Will co-ordinate a phone call visit with Dietitian.  - Plan to get fecal fat study/EGD and EUS soon to assess for chronic pancreatitis/pancreatic insufficiency.    #. Premature CAD: Followed by Dr. Rothman. He had presented with STEMI on 7/1/2012. This required revasc with drug eluting stents x2 from prox to mid LAD.   - currently on Aspirin.    #. Ischemic CMP: His last MRI in 7/2012 was 41% with large WMA consistent LAD distribution infarction. Last Echo on 7/2013 showed an EF of 40 - 45% with akinesis of the antr and anteroseptal walls from base to apex. No significant valvular disease.  Last Stress test on 5/2015  was neg with 12.8 METS achieved    #. HTN: BP well controlled on Carvedilol and ACEI.    #. Hypertriglyceridemia: He is on Statins.     #.Vitamin D deficiency: Advised to take Vit D regularly.    #. Ongoing Tobacco abuse: He has attempted to quit smoking unsuccessfully and would like to quit.  7/19/2021: Still smoking 5 - 6 cigs/day. He is interested in quitting and has the phone numbers for quit plan.    #. HCM: He has received COVID19 vaccination.  7/19/2021: We will do annuals at next visit.    Presley Alamo MD.  Pulmonary and Critical Care.  07/19/2021  10:02 AM

## 2021-07-16 ENCOUNTER — VIRTUAL VISIT (OUTPATIENT)
Dept: PULMONOLOGY | Facility: CLINIC | Age: 36
End: 2021-07-16
Attending: STUDENT IN AN ORGANIZED HEALTH CARE EDUCATION/TRAINING PROGRAM
Payer: COMMERCIAL

## 2021-07-16 DIAGNOSIS — R11.2 NAUSEA AND VOMITING, INTRACTABILITY OF VOMITING NOT SPECIFIED, UNSPECIFIED VOMITING TYPE: ICD-10-CM

## 2021-07-16 DIAGNOSIS — R10.13 ABDOMINAL PAIN, EPIGASTRIC: Primary | ICD-10-CM

## 2021-07-16 PROCEDURE — 99204 OFFICE O/P NEW MOD 45 MIN: CPT | Mod: 95 | Performed by: STUDENT IN AN ORGANIZED HEALTH CARE EDUCATION/TRAINING PROGRAM

## 2021-07-16 RX ORDER — PENICILLIN V POTASSIUM 250 MG/1
300 TABLET, FILM COATED ORAL 3 TIMES DAILY
COMMUNITY
End: 2021-07-19

## 2021-07-16 NOTE — PROGRESS NOTES
Cheo is a 36 year old who is being evaluated via a billable video visit.      How would you like to obtain your AVS? Rank [employer]  If the video visit is dropped, the invitation should be resent by: Other e-mail: [email or web address]  Will anyone else be joining your video visit? No    Video Start Time: 9:30 AM  Video-Visit Details    Type of service:  Video Visit    Video End Time:10:15 AM    Originating Location (pt. 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 He is presenting for followup of EtOH pancreatitis in 7/2020.      In Dec 2019, he developed abd pain, n/v in the setting of binge drinking. In July 2020, he was hospitalized with pancreatitis. Now he states his abd pain is worse with meals, especially if he eats a meal with animal meats. Abd pain feels crampy. He has lost 10 lb in past 6 months. Denies constipation or loose stools. Tylenol helps his pain.     At his OSHx in July 2020, he presented to [place] after 2 days of abd pain, n/v, and CT A/P showed acute pancreatitis. Per CT report, he had normal pancreatic parenchymal enhancement (which would not be consistent with CF contributing to pancreatic disease), there was also mild inflammatory changes in the pancreatic head, and no fluid collections. Other abdominal components were normal per CT report. Lipase at the time was 593 (which is >3x ULN). LFTs during that hospitalization were WNL.  "    Today he wanted to discuss his abdominal pain. He says it comes and goes and is worse when he \"eats something heavy\". This has been happening since March off an on. The pain is very sporadic. It lasts maybe hours when it hurts.It feels like \"little pinches\". The pain is umbilical. Lying down improves the pain. Heat worsens the pain and noise makes the pain worse. He does not endorse headache with the pain. He says he \"gets hot\" and feels like it limits his eating. He does not take any medications when he has the pain. THe abdominal pain worse when eating heavy meaty meals, so he has started eating salads and has lost 10lb in the past 6 months that is unintentional. He has vomited twice in the last month when he eats more than his pain can handle. Sometimes he has nausea with the pain.     Having a BM does not improve the pain. He has BM every morning, once per day. The BM are easy to pass. The consistency varies depending on what he is eating. He endorses more softer stools with eating more fiber, otherwise, if he eats less fiber, his stools are more solid. He does not see oil in stools and they do not float.     Denies melena and hematochezia.        ROS: 10pt ROS performed and otherwise negative.    PERTINENT PAST MEDICAL HISTORY:  As noted above.    PREVIOUS ABDOMINAL/GYNECOLOGIC SURGERIES:  As noted above.    PREVIOUS ENDOSCOPY:  No prior endoscopy.    PERTINENT MEDICATIONS:  Lisinopril  Carbedilol  Atorvastatin  Aspirin  Ibuprofen-not taking  Fexofenadine  One-day vitamin  Apple cider supplement    - Anticoagulation/Antiplatelet Agents: none  Medications reviewed with patient today, see Medication List/Assessment for details.  No other NSAID/anticoagulation reported by patient.  No other OTC/herbal/supplements reported by patient.    SOCIAL HISTORY:  Hx of prior binge drinking, now sober for 5-6 months  Occasional marijuana-he says it helps him eat with his abd pain.   Tobacco use, he endorses 6 cigarettes " per day. He has tried chanitx.     FAMILY HISTORY:  No colon/panc/esophageal/other GI CA, no other Turner or other HPS-related Mariela. No IBD/celiac, no other AI/liver/thyroid disease. No known FH bleeding/clotting disorders.    PHYSICAL EXAMINATION:  Video visit  Gen: alert and oriented, in NAD and non-toxic appearing  HEENT: MMM, no scleral icterus  Resp: breathing comfortably on room air  Skin: No jaundice or visible rashes  Neuro: grossly intact    PERTINENT STUDIES:  Labs 12/2020, no other more recent labs  Labs show a normal calcium and albumin, normal electrolytes, normal LFTs  Immunoglobulins WNL, other than elevated IgE (619, )  Normal INR  Normal TSH  Normal vit A and E, low Vit D (11)  Normal CBC and ESR    No recent abdominal imaging    ASSESSMENT/PLAN:      Pt has hx of prior EtOH pancreatitis. Lipase, abd pain, and CT findings all consistent with pancreatitis. Likely EtOH, as he had normal calcium and LFTs and CT did not comment on bile duct dilation. We do not have raw images, so will ask pt to get CT images transferred to us on a CD.     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 He was diagnosed as an adult at age 35. He had one hospitalization with acute (thought to be due to EtOH) pancreatitis. Since then he has had chronic abdominal pain with weight loss and intermittent nausea and vomiting. There is also a note in the chart that he may have had pancreatitis as a child.    The differential for his abdominal pain includes chronic pancreatitis, acute recurrent pancreatitis, gastroparesis. His weight los is concerning.     Plan:  -upper endoscopy to look for other causes of abdominal pain and endoscopic ultrasound to look for chronic pancreatitis  -quantitative 72 hour fecal fat test to assess for pancreatitis insufficiency  -diet journal for 2 weeks and meet with gastroenterology nutritionist        #Smoking cessation  Discussed  cessation and tips given smoking causes pancreatitis.  Gave him quit line number          Screening Colonoscopy at age 40 for Cancer Screening      RTC 12 weeks, sooner if symptomatic.     Thank you for this consultation. It was a pleasure to participate in the care of this patient; please contact us with any further questions.    Pt was seen and discussed with Dr. Beth.    Beulah Wolfe MD PhD   Gastroenterology Fellow

## 2021-07-16 NOTE — PATIENT INSTRUCTIONS
Dear Cheo,     It was great to meet with you today.    We think your pain could be from pancreatitis. Gastroparesis (paralysis of stomach) affecting emptying can also cause your symptoms.    Our plan is:  -upper endoscopy and endoscopic ultrasound  -quantitative 72 hour fecal fat test (you will have to collect stool and eat a high fat diet 100g)  -keep diet journal for 2 weeks and meet with gastroenterology nutritionist  -quit smoking (quit-line number is [phone], we are happy to help with prescriptions) since smoking causes pancreatitis    Let's followup in 8 weeks.    If you have any questions, please do not hesitate to contact our nurse, Светлана at [phone].    Sincerely,  Beulah Wolfe MD PhD   Gastroenterology Fellow

## 2021-07-16 NOTE — LETTER
7/16/2021         RE: Cheo Padron  [address] [place] [address]        Dear Colleague,    Thank you for referring your patient, Cheo Padron, to the [facility]. Please see a copy of my visit note below.    Cheo is a 36 year old who is being evaluated via a billable video visit.      How would you like to obtain your AVS? TamrharGreystripe  If the video visit is dropped, the invitation should be resent by: Other e-mail: [email or web address]  Will anyone else be joining your video visit? No    Video Start Time: 9:30 AM  Video-Visit Details    Type of service:  Video Visit    Video End Time:10:15 AM    Originating Location (pt. Location): Home    Distant Location (provider location):  [facility]     Platform used for Video Visit: Well     GI CLINIC VISIT - NEW PATIENT    CC/REFERRING PROVIDER: Dr Alamo  REASON FOR CONSULTATION: post-prandial abd pain, hx of CF, hx of EtOH pancreatitis    HPI: 36 year old male hx of current tobacco abuse, hx of HTN, HLD, CF (5T-TG12 plus a VUS ([id number])), CHF, premature CAD, hx of past binge drinking, hx of EtOH pancreatitis, now EtOH sober, presenting for consultation of post-prandial abd pain in the setting of CF and hx of pancreatitis as a child. He is presenting for followup of EtOH pancreatitis in 7/2020.      In Dec 2019, he developed abd pain, n/v in the setting of binge drinking. In July 2020, he was hospitalized with pancreatitis. Now he states his abd pain is worse with meals, especially if he eats a meal with animal meats. Abd pain feels crampy. He has lost 10 lb in past 6 months. Denies constipation or loose stools. Tylenol helps his pain.     At his OSHx in July 2020, he presented to [place] after 2 days of abd pain, n/v, and CT A/P showed acute pancreatitis. Per CT report, he had normal pancreatic parenchymal enhancement (which would not be  "consistent with CF contributing to pancreatic disease), there was also mild inflammatory changes in the pancreatic head, and no fluid collections. Other abdominal components were normal per CT report. Lipase at the time was 593 (which is >3x ULN). LFTs during that hospitalization were WNL.     Today he wanted to discuss his abdominal pain. He says it comes and goes and is worse when he \"eats something heavy\". This has been happening since March off an on. The pain is very sporadic. It lasts maybe hours when it hurts.It feels like \"little pinches\". The pain is umbilical. Lying down improves the pain. Heat worsens the pain and noise makes the pain worse. He does not endorse headache with the pain. He says he \"gets hot\" and feels like it limits his eating. He does not take any medications when he has the pain. THe abdominal pain worse when eating heavy meaty meals, so he has started eating salads and has lost 10lb in the past 6 months that is unintentional. He has vomited twice in the last month when he eats more than his pain can handle. Sometimes he has nausea with the pain.     Having a BM does not improve the pain. He has BM every morning, once per day. The BM are easy to pass. The consistency varies depending on what he is eating. He endorses more softer stools with eating more fiber, otherwise, if he eats less fiber, his stools are more solid. He does not see oil in stools and they do not float.     Denies melena and hematochezia.        ROS: 10pt ROS performed and otherwise negative.    PERTINENT PAST MEDICAL HISTORY:  As noted above.    PREVIOUS ABDOMINAL/GYNECOLOGIC SURGERIES:  As noted above.    PREVIOUS ENDOSCOPY:  No prior endoscopy.    PERTINENT MEDICATIONS:  Lisinopril  Carbedilol  Atorvastatin  Aspirin  Ibuprofen-not taking  Fexofenadine  One-day vitamin  Apple cider supplement    - Anticoagulation/Antiplatelet Agents: none  Medications reviewed with patient today, see Medication List/Assessment for " details.  No other NSAID/anticoagulation reported by patient.  No other OTC/herbal/supplements reported by patient.    SOCIAL HISTORY:  Hx of prior binge drinking, now sober for 5-6 months  Occasional marijuana-he says it helps him eat with his abd pain.   Tobacco use, he endorses 6 cigarettes per day. He has tried chanitx.     FAMILY HISTORY:  No colon/panc/esophageal/other GI CA, no other Turner or other HPS-related Mariela. No IBD/celiac, no other AI/liver/thyroid disease. No known FH bleeding/clotting disorders.    PHYSICAL EXAMINATION:  Video visit  Gen: alert and oriented, in NAD and non-toxic appearing  HEENT: MMM, no scleral icterus  Resp: breathing comfortably on room air  Skin: No jaundice or visible rashes  Neuro: grossly intact    PERTINENT STUDIES:  Labs 12/2020, no other more recent labs  Labs show a normal calcium and albumin, normal electrolytes, normal LFTs  Immunoglobulins WNL, other than elevated IgE (619, )  Normal INR  Normal TSH  Normal vit A and E, low Vit D (11)  Normal CBC and ESR    No recent abdominal imaging    ASSESSMENT/PLAN:      Pt has hx of prior EtOH pancreatitis. Lipase, abd pain, and CT findings all consistent with pancreatitis. Likely EtOH, as he had normal calcium and LFTs and CT did not comment on bile duct dilation. We do not have raw images, so will ask pt to get CT images transferred to us on a CD.     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 He was diagnosed as an adult at age 35. He had one hospitalization with acute (thought to be due to EtOH) pancreatitis. Since then he has had chronic abdominal pain with weight loss and intermittent nausea and vomiting. There is also a note in the chart that he may have had pancreatitis as a child.    The differential for his abdominal pain includes chronic pancreatitis, acute recurrent pancreatitis, gastroparesis. His weight los is concerning.     Plan:  -upper  endoscopy to look for other causes of abdominal pain and endoscopic ultrasound to look for chronic pancreatitis  -quantitative 72 hour fecal fat test to assess for pancreatitis insufficiency  -diet journal for 2 weeks and meet with gastroenterology nutritionist        #Smoking cessation  Discussed cessation and tips given smoking causes pancreatitis.  Gave him quit line number          Screening Colonoscopy at age 40 for Cancer Screening      RTC 12 weeks, sooner if symptomatic.     Thank you for this consultation. It was a pleasure to participate in the care of this patient; please contact us with any further questions.    Pt was seen and discussed with Dr. Beth.    Beulah Wolfe MD PhD   Gastroenterology Fellow          Attestation signed by Koki Beth MD at 8/9/2021  8:10 PM:  I participated in a video visit and discussed the management with the GI Fellow,Dr Wolfe on 7/16/2021. I reviewed the note and there are no changes to the past medical, family or social history. A complete 10 point review of systems was obtained. Please see the HPI for pertinent positives and negatives. All other systems were reviewed and were found to be negative. I agree with the documented findings and plan of care as outlined.    Thank you for this consultation.  It was a pleasure to participate in the care of this patient; please contact us with any further questions.  A total of 50 minutes spent on the date of the encounter doing chart review, history and exam, documentation and further activities as noted above. This note was created with voice recognition software, and while reviewed for accuracy, typos may remain.     Koki Beth MD  Gastroenterology       Again, thank you for allowing me to participate in the care of your patient.        Sincerely,        Beulah Wolfe MD

## 2021-07-19 ENCOUNTER — OFFICE VISIT (OUTPATIENT)
Dept: PULMONOLOGY | Facility: CLINIC | Age: 36
End: 2021-07-19
Attending: INTERNAL MEDICINE
Payer: COMMERCIAL

## 2021-07-19 ENCOUNTER — ALLIED HEALTH/NURSE VISIT (OUTPATIENT)
Dept: CARE COORDINATION | Facility: CLINIC | Age: 36
End: 2021-07-19

## 2021-07-19 VITALS
HEIGHT: 67 IN | HEART RATE: 86 BPM | DIASTOLIC BLOOD PRESSURE: 83 MMHG | WEIGHT: 141.09 LBS | RESPIRATION RATE: 17 BRPM | BODY MASS INDEX: 22.15 KG/M2 | SYSTOLIC BLOOD PRESSURE: 123 MMHG | OXYGEN SATURATION: 100 %

## 2021-07-19 DIAGNOSIS — E84.9 CYSTIC FIBROSIS (H): Primary | ICD-10-CM

## 2021-07-19 DIAGNOSIS — R10.13 ABDOMINAL PAIN, EPIGASTRIC: ICD-10-CM

## 2021-07-19 DIAGNOSIS — Z71.9 ENCOUNTER FOR COUNSELING: Primary | ICD-10-CM

## 2021-07-19 LAB
EXPTIME-PRE: 4.68 SEC
FEF2575-%PRED-PRE: 120 %
FEF2575-PRE: 4.54 L/SEC
FEF2575-PRED: 3.77 L/SEC
FEFMAX-%PRED-PRE: 85 %
FEFMAX-PRE: 8.27 L/SEC
FEFMAX-PRED: 9.63 L/SEC
FEV1-%PRED-PRE: 92 %
FEV1-PRE: 3.4 L
FEV1FEV6-PRE: 88 %
FEV1FEV6-PRED: 82 %
FEV1FVC-PRE: 88 %
FEV1FVC-PRED: 83 %
FIFMAX-PRE: 5.58 L/SEC
FVC-%PRED-PRE: 87 %
FVC-PRE: 3.88 L
FVC-PRED: 4.45 L

## 2021-07-19 PROCEDURE — 94375 RESPIRATORY FLOW VOLUME LOOP: CPT | Performed by: INTERNAL MEDICINE

## 2021-07-19 PROCEDURE — 99214 OFFICE O/P EST MOD 30 MIN: CPT | Mod: 25 | Performed by: INTERNAL MEDICINE

## 2021-07-19 PROCEDURE — 87070 CULTURE OTHR SPECIMN AEROBIC: CPT | Performed by: INTERNAL MEDICINE

## 2021-07-19 PROCEDURE — G0463 HOSPITAL OUTPT CLINIC VISIT: HCPCS | Mod: 25

## 2021-07-19 RX ORDER — AMOXICILLIN 500 MG/1
TABLET, FILM COATED ORAL
COMMUNITY
Start: 2021-04-06

## 2021-07-19 ASSESSMENT — MIFFLIN-ST. JEOR: SCORE: 1523.63

## 2021-07-19 ASSESSMENT — PAIN SCALES - GENERAL: PAINLEVEL: NO PAIN (0)

## 2021-07-19 NOTE — Clinical Note
"    7/19/2021         RE: Cheo Padron  [address] [place] [address]        Dear Colleague,    Thank you for referring your patient, Cheo Padron, to the [facility] FOR LUNG SCIENCE AND [facility] [place]. Please see a copy of my visit note below.    Reason for Visit  Cheo Padron is a 36year old male who is being seen for RECHECK (Return cystic fibrosis )    CF HPI  The patient was seen and examined by Presley Alamo MD   Cheo Padron is a 36 year old male  with HTN, hyperlipidemia, Mantoux positive, CHF and premature - CAD. He is a smoker. He was after w/u for pancreatitis and has a child with CF. H was worked up and it showed positive sweat test and diagnosed with CF in 2020.    He was admitted in 7/2020 with alcohol induced pancreatitis.     His symptoms started in December 2019.  It started off with abdominal pain and nausea vomiting.  It lasted for about 3 to 4 days and then it resolved.  This was preceded by binge drinking.  He used to drink 3-4 drinks every day and sometimes on weekends up to 14-15 drinks.  The pain resolved and he was doing well up until February 2020.  This pain recurred along with nausea and vomiting.  This again lasted for a few days and resolved.  This also was associated with binge drinking episode.    The symptoms recurred in July 2020 at which time he did go to the emergency room to have it evaluated.  He was diagnosed with pancreatitis.  Since his hospital stay the pain in his abdomen is slowly improved.  He is now having about a level 3 pain on and off throughout the day.  He notices the pain is worse especially when he eats \"heavy meaty meal\".  The pain is of mild crampy quality most of the time.  And when he eats a heavy meal is the pain is more worse and it last longer.  He is taken to eating light foods mostly salads.  He has lost about 10 pounds in the last 6 months.  He does not having any nausea or vomiting at this time. "  Does not have any constipation or loose stools.  He is taking up to 2-3 Tylenols per day to manage the pain.  He has also taken ibuprofen to manage the pain.    He has stopped drinking for good.  Interval history:   Abd pain/fullness and emesis still persist. Seen by GI. Eats one big meal/day.  No constipation/loose stools. No fat in stools.   No Cough/SOB/CP.     Current Outpatient Medications   Medication     acetaminophen (TYLENOL) 325 MG tablet     amoxicillin (AMOXIL) 500 MG tablet     aspirin (ASA) 81 MG EC tablet     atorvastatin (LIPITOR) 80 MG tablet     carvedilol (COREG) 6.25 MG tablet     fexofenadine (ALLEGRA) 180 MG tablet     ibuprofen (ADVIL/MOTRIN) 400 MG tablet     lisinopril (ZESTRIL) 5 MG tablet     No current facility-administered medications for this visit.     Allergies   Allergen Reactions     Enoxaparin Hives     Urticaria started 4 days after starting lovenox.    Unclear if rash due to lovenox but this seems most likely today (7/9/2012).    One week ago started metoprolol, asa, plavix, lisinopril, simvastatin, and spironolactone.       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     ST elevation myocardial infarction involving left anterior descending (LAD) coronary artery (H) 2012       Past Surgical History:   Procedure Laterality Date     CT CORONARY ANGIOGRAM  07/01/2012    Has two drug eluting stents       Social History     Socioeconomic History     Marital status: Single     Spouse name: Not on file     Number of children: Not on file     Years of education: Not on file     Highest education level: Not on file   Occupational History     Not on file   Tobacco Use     Smoking status: Current Every Day Smoker     Types: Cigarettes     Smokeless tobacco: Never Used   Substance and Sexual Activity  "    Alcohol use: Not Currently     Drug use: Not Currently     Sexual activity: Not on file   Other Topics Concern     Parent/sibling w/ CABG, MI or angioplasty before 65F 55M? Not Asked   Social History Narrative     Not on file     Social Determinants of Health     Financial Resource Strain:      Difficulty of Paying Living Expenses:    Food Insecurity:      Worried About Running Out of Food in the Last Year:      Ran Out of Food in the Last Year:    Transportation Needs:      Lack of Transportation (Medical):      Lack of Transportation (Non-Medical):    Physical Activity:      Days of Exercise per Week:      Minutes of Exercise per Session:    Stress:      Feeling of Stress :    Social Connections:      Frequency of Communication with Friends and Family:      Frequency of Social Gatherings with Friends and Family:      Attends Gnosticism Services:      Active Member of Clubs or Organizations:      Attends Club or Organization Meetings:      Marital Status:    Intimate Partner Violence:      Fear of Current or Ex-Partner:      Emotionally Abused:      Physically Abused:      Sexually Abused:        ROS Pulmonary  Constitutional- Denies weight loss, has not gained weight. No F/C/S.  Eyes- Negative  Ear, nose and throat- Has allergies in the last couple weeks.  This year it is better.  Cardiac- Negative  Pulm- Negative  GI- See HPI. No Heartburn.   Genitourinary- Negative  Musculoskeletal- Negative  Neurology- Negative  Dermatology- Negative  Endocrine- Negative  Lymphatic- Negative  Psychiatry- Negative    A complete ROS was otherwise negative except as noted in the HPI.  /83   Pulse 86   Resp 17   Ht 1.702 m (5' 7\")   Wt 64 kg (141 lb 1.5 oz)   SpO2 100%   BMI 22.10 kg/m     Exam:   GENERAL APPEARANCE: Well developed, well nourished, alert, and in no apparent distress.  EYES: PERRL, EOMI  HENT: Nasal mucosa with no edema and no hyperemia. No nasal polyps.  EARS: Both Ear canals full of \"Wax\", TMs " normal  MOUTH: Oral mucosa is moist, without any lesions, no tonsillar enlargement, no oropharyngeal exudate.  NECK: supple, no masses, no thyromegaly.  LYMPHATICS: No significant axillary, cervical, or supraclavicular nodes.  RESP: normal percussion, good air flow throughout.  No crackles. No rhonchi. No wheezes.  CV: Normal S1, S2, regular rhythm, normal rate. No murmur.  No rub. No gallop. No LE edema.   ABDOMEN:  Bowel sounds normal, soft, nontender, no HSM or masses.   MS: extremities normal. No clubbing. No cyanosis.  SKIN: no rash on limited exam  NEURO: Mentation intact, speech normal, normal strength and tone, normal gait and stance  PSYCH: mentation appears normal. and affect normal/bright.    Results:  Recent Results (from the past 168 hour(s))   General PFT Lab (Please always keep checked)    Collection Time: 07/19/21  9:58 AM   Result Value Ref Range    FVC-Pred 4.45 L    FVC-Pre 3.88 L    FVC-%Pred-Pre 87 %    FEV1-Pre 3.40 L    FEV1-%Pred-Pre 92 %    FEV1FVC-Pred 83 %    FEV1FVC-Pre 88 %    FEFMax-Pred 9.63 L/sec    FEFMax-Pre 8.27 L/sec    FEFMax-%Pred-Pre 85 %    FEF2575-Pred 3.77 L/sec    FEF2575-Pre 4.54 L/sec    JLX5411-%Pred-Pre 120 %    ExpTime-Pre 4.68 sec    FIFMax-Pre 5.58 L/sec    FEV1FEV6-Pred 82 %    FEV1FEV6-Pre 88 %         Results as noted above.    PFT Interpretation:  Normal spirometry.  FEV1 improved c/w previous by 270ml.  BEST FEV1 todate  Valid Maneuver      Assessment and plan: Cheo Padron is a 36 year old male with HTN, hyperlipidemia, CHF and premature - CAD. He is a smoker. He is being seen today for CF follow up.     #. CF vs. CFTR disorder: He has essentially normal lung function with no pulmonary symptoms. His CF sweat chloride tests were positive.    CF genetics: 5T-TG12 plus a VUS ([id number]).   Will have CF  weigh in on this.  Last CXR from 9/14/2016 per report did not show any bronchiectasis.  7/19/2021: Pulm sx stable. We will obtain Spirometry next  visit.    CF exacerbation: Absent    #. Recurrent Acute Pancreatitis: His first episode was on 7/2012 requiring hospitalization. He has had atleast two other episodes prior to that. it was felt to be ETOH induced, it is difficult to r/o  or rule in CF playing a role. His ongoing sx while not typically raise the question of chronic pancreatitis.   7/19/2021: Our Dietitian has not been able to contact.   - Appreciate Dr. Beth (GI)/Dr. Davalos input.   - Will co-ordinate a phone call visit with Dietitian.  - Plan to get fecal fat study/EGD and EUS soon to assess for chronic pancreatitis/pancreatic insufficiency.    #. Premature CAD: Followed by Dr. Rothman. He had presented with STEMI on 7/1/2012. This required revasc with drug eluting stents x2 from prox to mid LAD.   - currently on Aspirin.    #. Ischemic CMP: His last MRI in 7/2012 was 41% with large WMA consistent LAD distribution infarction. Last Echo on 7/2013 showed an EF of 40 - 45% with akinesis of the antr and anteroseptal walls from base to apex. No significant valvular disease.  Last Stress test on 5/2015  was neg with 12.8 METS achieved    #. HTN: BP well controlled on Carvedilol and ACEI.    #. Hypertriglyceridemia: He is on Statins.     #.Vitamin D deficiency: Advised to take Vit D regularly.    #. Ongoing Tobacco abuse: He has attempted to quit smoking unsuccessfully and would like to quit.  7/19/2021: Still smoking 5 - 6 cigs/day. He is interested in quitting and has the phone numbers for quit plan.    #. HCM: He has received COVID19 vaccination.  7/19/2021: We will do annuals at next visit.    Presley Alamo MD.  Pulmonary and Critical Care.  07/19/2021  10:02 AM         Again, thank you for allowing me to participate in the care of your patient.        Sincerely,        Presley Alamo MD

## 2021-07-19 NOTE — PROGRESS NOTES
Adult Cystic Fibrosis Program  Social Work Phone    Data:   Met with Ceho and his SO (Jo) to introduce self, SW role and offer support.  Cheo was recently diagnosed with CF after a work up for pancreatitis and GI issues.  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  Together they have 2 children (ages 12 and 10) who also have CF and receive their care at [facility].  Cheo also has a older child as does Jo from previous relationships.  They are in the process of adopting 2 children (ages 4 and 2) and these children are familiar to them through personal relationships.  He admits to feeling overwhelmed with his new health issues and struggling with fatigue/energy and irritability.  He agreed to meet with SW for a more thorough psychosocial assessment at his next clinic visit.      Cheo and Jo's main question today was regarding whether insurance or whether there are dia funding assistance to help pay for ensure, he has been struggling to main/increase his weight and finds ensure helpful.  SW to reach out to CF dietitian to discuss further but provided some brief education on [employer] and how this might be an option for assistance with purchasing ensure.      They denied having additional questions/concerns for SW at this time.  Provided my card and encouraged them to call anytime.      Intervention:   -Introduction to CF SW role and availability  -Building rapport  -Coordination with CF dietitian re: resources for Ensure    Assessment:  SW visit today brief and mainly to introduce self and CF SW role.  We agreed to plan to do a full psychosocial assessment at upcoming clinic visit.  They have a busy home life and Cheo manages the household tasks while Jo works outside the home.  He is struggling with some irritability per Jo and we will discuss further at our next visit.  Per chart, he has quit drinking alcohol but  continues to smoke ~5-6 cigarettes per day.    Plan:   Continue to assist with above concern(s)  Continue to follow through regular clinic consult and annual visit     JOE Whipple, [facility]  Adult Cystic Fibrosis   Ph: [phone]  Pager: [phone]

## 2021-07-19 NOTE — NURSING NOTE
Chief Complaint   Patient presents with     RECHECK     Return cystic fibrosis     Medications reviewed and vital signs taken.   Odilia Jorgensen, CMA

## 2021-07-24 LAB — BACTERIA SPEC CULT: NORMAL

## 2021-08-09 ENCOUNTER — VIRTUAL VISIT (OUTPATIENT)
Dept: GASTROENTEROLOGY | Facility: CLINIC | Age: 36
End: 2021-08-09
Attending: STUDENT IN AN ORGANIZED HEALTH CARE EDUCATION/TRAINING PROGRAM
Payer: COMMERCIAL

## 2021-08-09 DIAGNOSIS — R63.4 UNINTENDED WEIGHT LOSS: ICD-10-CM

## 2021-08-09 DIAGNOSIS — R11.2 NAUSEA AND VOMITING, INTRACTABILITY OF VOMITING NOT SPECIFIED, UNSPECIFIED VOMITING TYPE: Primary | ICD-10-CM

## 2021-08-09 DIAGNOSIS — R10.13 ABDOMINAL PAIN, EPIGASTRIC: ICD-10-CM

## 2021-08-09 PROCEDURE — 97802 MEDICAL NUTRITION INDIV IN: CPT | Mod: 95 | Performed by: DIETITIAN, REGISTERED

## 2021-08-09 NOTE — PROGRESS NOTES
"Cheo Padron is a 36 year old male who is being evaluated via a billable telephone visit.     The patient has been notified of following:     \"This telephone visit will be conducted via a call between you and your physician/provider. We have found that certain health care needs can be provided without the need for a physical exam.  This service lets us provide the care you need with a short phone conversation.  If a prescription is necessary we can send it directly to your pharmacy.  If lab work is needed we can place an order for that and you can then stop by our lab to have the test done at a later time.    Telephone visits are billed at different rates depending on your insurance coverage. During this emergency period, for some insurers they may be billed the same as an in-person visit.  Please reach out to your insurance provider with any questions.    If during the course of the call the physician/provider feels a telephone visit is not appropriate, you will not be charged for this service.\"    Patient has given verbal consent for Telephone visit?  Yes    What phone number would you like to be contacted at? [phone]    How would you like to obtain your AVS? My chart    Phone call duration: 32 minutes    During this virtual visit the patient is located in [place], patient verifies this as the location during the entirety of this visit.     [facility] Nutrition Therapy      Time Spent:  32 minutes  Session Type:  Initial   Referring Physician:  Beulah Wolfe MD and Koki Beth MD  Reason for RD Visit:   Nutritional counseling for gastroparesis diet, nausea, vomiting, unintended weight loss, abdominal pain.     Nutrition Assessment:  Patient is a 36 year old male with history of cystic fibrosis, alcoholic induced pancreatitis, sober x 6 months, tobacco abuse, hypertension, hyperlipidemia, CHF who c/o post prandial abdominal pain, nausea and vomiting and unintentional " "weight loss.     At visit today, he stated that he has been feeling better since last month's appointment with MD. His stomach is feeling better now, less vomiting as well, and only Intermittently he has stomach issues now. He reported only having vomiting once last week after eating a piece of pizza. He stated that he can feel food sitting in his chest and stomach and not moving after eating. He was referred to [facility] to discuss gastroparesis diet. Additionally MD recommended fecal elastase and fecal fat tests. He stated that he hasn't completed these yet. He does not feel well after eating heavier meals and expressed concern that a high fat diet for the fecal fat test may not be tolerated. He occasionally drinks an ensure drink. He stated that the very high protein ensure was not tolerated but the traditional/original one is better tolerated. He tends to skip breakfast due to not hungry and does not feel that well in the morning. He eats a light lunch and dinner meals and also eats some snacks. He likes sweets and candy in addition to likes and tolerates having cheese for snacks/as part of snacks. He stated that his stools are \"solid\" and denies any issues with constipation, loose stools and also denies any oily stools. He stated that he previously lost about 10 lbs because he was not eating, but now he believes he is regaining some weight because he is now eating. He doesn't have a scale so uncertain of his actual current weight but he feels he is now gaining.      Height:   Ht Readings from Last 1 Encounters:   07/19/21 1.702 m (5' 7\")     Weight: he stated that he believes he is gaining weight as he is eating more now. Previously   Wt Readings from Last 10 Encounters:   07/19/21 64 kg (141 lb 1.5 oz)   12/28/20 66.2 kg (146 lb)     BMI: Estimated body mass index is 22.1 kg/m  as calculated from the following:    Height as of 7/19/21: 1.702 m (5' 7\").    Weight as of 7/19/21: 64 kg (141 lb 1.5 oz).    Diet Recall:  " (some usual/recent meals/snacks/beverages): He eats small meals. Cannot eat heavy/large meals otherwise has stomach issues. He tolerates chicken and fish, lunch meats and cheese but is careful/does not tolerate red meat. His wife made meatloaf yesterday but he avoided it and had some lunch meat and cheese which is better tolerated.   Meal Food    Breakfast skips   Lunch Lunch meat, cheese and crackers   Dinner Same as lunch or may just have some chicken or fish   Snacks Lunch meat, fruit (elizabeth, kiwi, plum), cheese, crackers, candy (nutty mlei), chocolate. Stated that he eats a lot of cheese   Beverages 3 bottles of snapple apple juice, 7 x 16 oz bottles water, occas ensure   Alcohol Intake None. 6 months sober.      Labs:    Last Comprehensive Metabolic Panel:  Sodium   Date Value Ref Range Status   12/28/2020 141 133 - 144 mmol/L Final     Potassium   Date Value Ref Range Status   12/28/2020 3.9 3.4 - 5.3 mmol/L Final     Chloride   Date Value Ref Range Status   12/28/2020 107 94 - 109 mmol/L Final     Carbon Dioxide   Date Value Ref Range Status   12/28/2020 30 20 - 32 mmol/L Final     Anion Gap   Date Value Ref Range Status   12/28/2020 4 3 - 14 mmol/L Final     Glucose   Date Value Ref Range Status   12/28/2020 95 70 - 99 mg/dL Final     Urea Nitrogen   Date Value Ref Range Status   12/28/2020 4 (L) 7 - 30 mg/dL Final     Creatinine   Date Value Ref Range Status   12/28/2020 0.87 0.66 - 1.25 mg/dL Final     GFR Estimate   Date Value Ref Range Status   12/28/2020 >90 >60 mL/min/[1.73_m2] Final     Comment:     Non  GFR Calc  Starting 12/18/2018, serum creatinine based estimated GFR (eGFR) will be   calculated using the Chronic Kidney Disease Epidemiology Collaboration   (CKD-EPI) equation.       Calcium   Date Value Ref Range Status   12/28/2020 8.9 8.5 - 10.1 mg/dL Final     CBC RESULTS:   Recent Labs   Lab Test 12/28/20  1038   WBC 5.6   RBC 4.86   HGB 15.6   HCT 46.2   MCV 95   MCH 32.1    [facility]   RDW 11.3          Pertinent Medications/vitamin and mineral supplements:      Current Outpatient Medications   Medication     acetaminophen (TYLENOL) 325 MG tablet     amoxicillin (AMOXIL) 500 MG tablet     aspirin (ASA) 81 MG EC tablet     atorvastatin (LIPITOR) 80 MG tablet     carvedilol (COREG) 6.25 MG tablet     cholecalciferol (VITAMIN D3) 25 mcg (1000 units) capsule     fexofenadine (ALLEGRA) 180 MG tablet     ibuprofen (ADVIL/MOTRIN) 400 MG tablet     lisinopril (ZESTRIL) 5 MG tablet     No current facility-administered medications for this visit.     Food Allergies:  NKFA    MALNUTRITION:  % Weight Loss:  Weight loss does not meet criteria for malnutrition   % Intake:  <75% for >/= 1 month (non-severe malnutrition)  Subcutaneous Fat Loss:  Unable to assess  Muscle Loss:  Unable to assess  Fluid Retention:  None noted    Malnutrition Diagnosis: Patient does not meet two of the above criteria necessary for diagnosing malnutrition  In Context of:  Chronic illness or disease    Nutrition Diagnosis:    Food and nutrition related knowledge deficit related to lack of previous diet education/lack of complete recall of previous diet education as evidenced by pt report and interest in diet education/review.    Nutrition Prescription: smaller more frequent meals (lower to moderate in fat/fiber as tolerated/gastroparesis diet guidelines)    Nutrition Intervention:    Nutrition Education/Counseling:  Provided diet educations with tips and suggestions for increasing intake as tolerated/that may be better tolerated by patient. Provided diet education for gastroparesis. Discussed recommendations for eating at 4-6 smaller meals/snacks per day if better tolerated and recommended not skipping meals. Can have oral nutrition supplement/protein drink for meal(s) or snack(s) instead of skipping. Discussed some example drinks that may be better tolerated. Also reviewed some clear liquid protein drinks pt can  try especially if unable to tolerate full liquid drinks. Recommended eating a lower fat diet and lower fiber diet as tolerated. Provided diet education on lowfat diet. Dscussed foods high in fat and substitutions for those foods. Discussed food sources of fiber and discussed some tips for following a low fiber diet. Discussed ways in which to include some fruits and vegetables that may be better tolerated such as removing skins, peels, seeds from fruits and vegetables and cooking well before eating and having canned non-stringy fruits.     Encouraged patient to chew food well before swallowing. Discussed adequate hydration with recommendation to drink at least 64 oz or more (8 cups) fluids per day. Encouraged patient to limit fluids with meals except sips as needed and drink the majority of fluids in between meals which may help so patient does not get too full at meals and to allow patient to eat solid foods at meals.     Patient verbalized understanding of education provided. See Goals below.     Goals:    1. Recommend drinking at least 1 Ensure drink daily (could drink this for breakfast/in the morning instead of skipping that meal).    2. Schedule your labs that Dr. Wolfe recommended getting. Here is the number to schedule your labs: 612-    3. Recommend eating 4-6 smaller meals per day. (ensure drinks count as a smaller meal).    4. Recommended switching to 100% juice or one that does not contain high fructose corn syrup (as sometimes high fructose corn syrup is not well tolerated).    --Below are the diet recommendations for a delayed gastric emptying diet (gastroparesis), but for the fecal fat test, you will need to eat a high fat diet (100 grams per day), so you could complete this lab first before eating lower in fat diet.    --Also you can adjust the fat and fiber in your diet as it is tolerated so for example if you are tolerating some fiber from fruit and tolerating cheese without issues then you do  not need to be very restrictive and can follow more of a moderate fiber, moderate fat diet as tolerated.    Gastroparesis (delayed gastric emptying) diet tips below:    1. Eat 4-6 smaller meals per day that are lower in fat and lower in fiber.  For low fiber diet:  -Remove skins and peels from fruits and vegetables and cook vegetables very well before eating.  -Canned fruit in its own juice is lower in fiber, as well as ripe bananas, ripe melon, unsweetened applesauce.  -Avoid whole grain breads/crackers (choose white bread, low fiber crackers, white rice).  -Avoid whole nuts and seeds.  -Okay for creamy nut butters. Avoid chunky nut butters.    For low fat diet:  -Choose lean proteins/lean cuts of meats. Remove skins from chicken and turkey breast, fish (not breaded and fried), lean cuts of pork, limit red meat and if having choose lean cut or 93% lean ground beef. Can substitute with ground turkey or ground chicken for beef.  -Use lower fat cooking methods such as baking, broiling, grilling.  -Limit using a lot of fats/oil/high fat sauces/dressing/butter when preparing foods and limit the amount used on foods.  -Choose skim or 1% based dairy products (such as lowfat yogurts, skim/1% milk, non fat/1% cottage cheese, lowfat pudding).  -Do not eat large amounts of nuts, seeds, nut butters, avocado.    2. Can have protein drink as a small meal/snack if better tolerated and/or instead of skipping a meal. These drinks count towards those 4-6 smaller meals.  -some examples include using a protein powder of your choice and mixing with water/skim or lowfat milk or lowfat milk substitute, or premier protein, ensure max, ensure high protein, boost high protein, lowfat lower sugar carnation instant breakfast okay as well (if you tolerate dairy products).    If you are having a lot of nausea a clear liquid protein drink/protein water may be better tolerated such as Premier clear, Protein 2o, Isopure. (Boost Breeze and Ensure  clear are clear liquid drinks but are higher in added sugars so may not be as well tolerated with issues of loose stools).    3. Chew food very well before swallowing.  4. Limit fluids with meals (only take small sips as needed) and drink the rest of your fluids in between meals.    Nutrition Monitoring and Evaluation: Will monitor adherence to nutrition recommendations at future RD visits.     Further Medical Nutrition Therapy:  Follow-up. Continue to follow with CF RD and can follow up with this writer as needed or within the next 1-2 months if pt would like f/up with GI RD.    Patient was encouraged to call/contact RD with any further questions.    Tonja Byers, MS, RD, LD

## 2021-08-09 NOTE — PATIENT INSTRUCTIONS
It was nice speaking with you today. Below are the nutrition recommendations we discussed at your visit.    Please let me know if you have any additional questions.    Nutrition Recommendations    1. Recommend drinking at least 1 Ensure drink daily (could drink this for breakfast/in the morning instead of skipping that meal).    2. Schedule your labs that Dr. Wolfe recommended getting. Here is the number to schedule your labs: 612-    3. Recommend eating 4-6 smaller meals per day. (ensure drinks count as a smaller meal).    4. Recommended switching to 100% juice or one that does not contain high fructose corn syrup (as sometimes high fructose corn syrup is not well tolerated).    --Below are the diet recommendations for a delayed gastric emptying diet (gastroparesis), but for the fecal fat test, you will need to eat a high fat diet (100 grams per day), so you could complete this lab first before eating lower in fat diet.    --Also you can adjust the fat and fiber in your diet as it is tolerated so for example if you are tolerating some fiber from fruit and tolerating cheese without issues then you do not need to be very restrictive and can follow more of a moderate fiber, moderate fat diet as tolerated.    Gastroparesis (delayed gastric emptying) diet tips below:    1. Eat 4-6 smaller meals per day that are lower in fat and lower in fiber.  For low fiber diet:  -Remove skins and peels from fruits and vegetables and cook vegetables very well before eating.  -Canned fruit in its own juice is lower in fiber, as well as ripe bananas, ripe melon, unsweetened applesauce.  -Avoid whole grain breads/crackers (choose white bread, low fiber crackers, white rice).  -Avoid whole nuts and seeds.  -Okay for creamy nut butters. Avoid chunky nut butters.    For low fat diet:  -Choose lean proteins/lean cuts of meats. Remove skins from chicken and turkey breast, fish (not breaded and fried), lean cuts of pork, limit red meat  and if having choose lean cut or 93% lean ground beef. Can substitute with ground turkey or ground chicken for beef.  -Use lower fat cooking methods such as baking, broiling, grilling.  -Limit using a lot of fats/oil/high fat sauces/dressing/butter when preparing foods and limit the amount used on foods.  -Choose skim or 1% based dairy products (such as lowfat yogurts, skim/1% milk, non fat/1% cottage cheese, lowfat pudding).  -Do not eat large amounts of nuts, seeds, nut butters, avocado.    2. Can have protein drink as a small meal/snack if better tolerated and/or instead of skipping a meal. These drinks count towards those 4-6 smaller meals.  -some examples include using a protein powder of your choice and mixing with water/skim or lowfat milk or lowfat milk substitute, or premier protein, ensure max, ensure high protein, boost high protein, lowfat lower sugar carnation instant breakfast okay as well (if you tolerate dairy products).    If you are having a lot of nausea a clear liquid protein drink/protein water may be better tolerated such as Premier clear, Protein 2o, Isopure. (Boost Breeze and Ensure clear are clear liquid drinks but are higher in added sugars so may not be as well tolerated with issues of loose stools).    3. Chew food very well before swallowing.  4. Limit fluids with meals (only take small sips as needed) and drink the rest of your fluids in between meals.    Continue to follow with your dietitian in the cystic fibrosis clinic. You can follow up with me as needed, but if you would also like to follow up with me in the next 1-2 months, you can as well. My scheduling number is [phone].    Tonja Byers, MS, RD, LD

## 2021-08-10 ENCOUNTER — HOSPITAL ENCOUNTER (OUTPATIENT)
Facility: CLINIC | Age: 36
Discharge: HOME | End: 2021-08-10
Admitting: INTERNAL MEDICINE
Payer: COMMERCIAL

## 2021-08-10 PROCEDURE — 82710 FATS/LIPIDS FECES QUANT: CPT | Performed by: INTERNAL MEDICINE

## 2021-08-12 ENCOUNTER — APPOINTMENT (OUTPATIENT)
Dept: LAB | Facility: CLINIC | Age: 36
End: 2021-08-12
Payer: COMMERCIAL

## 2021-08-20 LAB
COLLECT DURATION TIME STL: 72 H
FAT 24H STL-MRATE: <1 G/24 H (ref 2–7)
SPECIMEN WT STL QN: 61 G

## 2021-08-20 NOTE — RESULT ENCOUNTER NOTE
Dear Cheo,    Your fecal fat level is low. If it was high, you would pancreatic enzymes, but this test shows your body is absorbing the fat correctly, so at this time, we can defer starting the pancreatic enzyme medication and focus on other ways to improve your symptoms.    If you have any questions, please do not hesitate to contact our nurse, Светлана at [phone].      Sincerely,  Beulah Wolfe MD PhD   Gastroenterology Fellow

## 2021-08-24 ENCOUNTER — TELEPHONE (OUTPATIENT)
Dept: GASTROENTEROLOGY | Facility: CLINIC | Age: 36
End: 2021-08-24

## 2021-08-24 NOTE — TELEPHONE ENCOUNTER
[facility]     Referring provider:  Beulah Wolfe MD    Referred to: [facility]     Provider Requested:  NA     Referral Received: 08/24/21       Records received: in Epic     Images received: in Epic    Evaluation for: EGD/EUS for abd pain/chronic panc     Clinical History (per RN review):     Per referring provider note 7-16-21  ASSESSMENT/PLAN:        Pt has hx of prior EtOH pancreatitis. Lipase, abd pain, and CT findings all consistent with pancreatitis. Likely EtOH, as he had normal calcium and LFTs and CT did not comment on bile duct dilation. We do not have raw images, so will ask pt to get CT images transferred to us on a CD.      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 He was diagnosed as an adult at age 35. He had one hospitalization with acute (thought to be due to EtOH) pancreatitis. Since then he has had chronic abdominal pain with weight loss and intermittent nausea and vomiting. There is also a note in the chart that he may have had pancreatitis as a child.     The differential for his abdominal pain includes chronic pancreatitis, acute recurrent pancreatitis, gastroparesis. His weight los is concerning.      Plan:  -upper endoscopy to look for other causes of abdominal pain and endoscopic ultrasound to look for chronic pancreatitis  -quantitative 72 hour fecal fat test to assess for pancreatitis insufficiency  -diet journal for 2 weeks and meet with gastroenterology nutritionist      MD review date: 8/24/21  MD Decision for clinic consultation/Orders:            Referral updates/Patient contacted:

## 2021-08-24 NOTE — TELEPHONE ENCOUNTER
Called Pt to schedule an appt with Dr. Rios.     Referring provider:  Beulah Wolfe MD  Evaluation for: EGD/EUS for abd pain/chronic panc    No answer. Left VM for Pt to call back.    Jony Smith LPN

## 2021-08-25 NOTE — TELEPHONE ENCOUNTER
Attempted to contact Pt again to get him scheduled with Dr. Rios. No answer. Left VM for Pt to call back.    Jony Smith LPN

## 2021-08-26 NOTE — TELEPHONE ENCOUNTER
Unable to reach Pt. Letter sent to Pt to follow up with the clinic RE referral.    Jony Smith LPN

## 2022-01-24 ENCOUNTER — VIRTUAL VISIT (OUTPATIENT)
Dept: PULMONOLOGY | Facility: CLINIC | Age: 37
End: 2022-01-24
Attending: INTERNAL MEDICINE
Payer: COMMERCIAL

## 2022-01-24 DIAGNOSIS — Z53.9 ERRONEOUS ENCOUNTER--DISREGARD: Primary | ICD-10-CM

## 2022-01-24 PROCEDURE — 10000001 PR ERRONEOUS ENCOUNTER--DISREGARD: Performed by: INTERNAL MEDICINE

## 2022-01-24 NOTE — LETTER
"  1/24/2022     RE: Cheo Padron  [address] [place] [address]    Dear Colleague,    Thank you for referring your patient, Cheo Padron, to the [facility] FOR LUNG SCIENCE AND [facility] [place]. Please see a copy of my visit note below.    Pt was unable to connect and this appt did not happen.    Reason for Visit  Cheo Padron is a 36year old male who is being seen for RECHECK (Return video visit )    CF HPI  The patient was seen and examined by Presley Alamo MD   Cheo Padron is a 36 year old male  with HTN, hyperlipidemia, Mantoux positive, CHF and premature - CAD. He is a smoker. He was after w/u for pancreatitis and has a child with CF. H was worked up and it showed positive sweat test and diagnosed with CF in 2020.    He was admitted in 7/2020 with alcohol induced pancreatitis.     His symptoms started in December 2019.  It started off with abdominal pain and nausea vomiting.  It lasted for about 3 to 4 days and then it resolved.  This was preceded by binge drinking.  He used to drink 3-4 drinks every day and sometimes on weekends up to 14-15 drinks.  The pain resolved and he was doing well up until February 2020.  This pain recurred along with nausea and vomiting.  This again lasted for a few days and resolved.  This also was associated with binge drinking episode.    The symptoms recurred in July 2020 at which time he did go to the emergency room to have it evaluated.  He was diagnosed with pancreatitis.  Since his hospital stay the pain in his abdomen is slowly improved.  He is now having about a level 3 pain on and off throughout the day.  He notices the pain is worse especially when he eats \"heavy meaty meal\".  The pain is of mild crampy quality most of the time.  And when he eats a heavy meal is the pain is more worse and it last longer.  He is taken to eating light foods mostly salads.  He has lost about 10 pounds in the last 6 months.  He does not " having any nausea or vomiting at this time.  Does not have any constipation or loose stools.  He is taking up to 2-3 Tylenols per day to manage the pain.  He has also taken ibuprofen to manage the pain.    He has stopped drinking for good.  Interval history:   Abd pain/fullness and emesis still persist. Seen by GI. Eats one big meal/day.  No constipation/loose stools. No fat in stools.   No Cough/SOB/CP.     Current Outpatient Medications   Medication     acetaminophen (TYLENOL) 325 MG tablet     amoxicillin (AMOXIL) 500 MG tablet     aspirin (ASA) 81 MG EC tablet     atorvastatin (LIPITOR) 80 MG tablet     carvedilol (COREG) 6.25 MG tablet     cholecalciferol (VITAMIN D3) 25 mcg (1000 units) capsule     fexofenadine (ALLEGRA) 180 MG tablet     ibuprofen (ADVIL/MOTRIN) 400 MG tablet     lisinopril (ZESTRIL) 5 MG tablet     No current facility-administered medications for this visit.     Allergies   Allergen Reactions     Enoxaparin Hives     Urticaria started 4 days after starting lovenox.    Unclear if rash due to lovenox but this seems most likely today (7/9/2012).    One week ago started metoprolol, asa, plavix, lisinopril, simvastatin, and spironolactone.       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     ST elevation myocardial infarction involving left anterior descending (LAD) coronary artery (H) 2012       Past Surgical History:   Procedure Laterality Date     CT CORONARY ANGIOGRAM  07/01/2012    Has two drug eluting stents       Social History     Socioeconomic History     Marital status: Single     Spouse name: Not on file     Number of children: Not on file     Years of education: Not on file     Highest education level: Not on file   Occupational History     Not on file   Tobacco Use     Smoking status: Current  "Every Day Smoker     Types: Cigarettes     Smokeless tobacco: Never Used   Substance and Sexual Activity     Alcohol use: Not Currently     Drug use: Not Currently     Sexual activity: Not on file   Other Topics Concern     Parent/sibling w/ CABG, MI or angioplasty before 65F 55M? Not Asked   Social History Narrative     Not on file     Social Determinants of Health     Financial Resource Strain: Not on file   Food Insecurity: Not on file   Transportation Needs: Not on file   Physical Activity: Not on file   Stress: Not on file   Social Connections: Not on file   Intimate Partner Violence: Not on file   Housing Stability: Not on file       ROS Pulmonary  Constitutional- Denies weight loss, has not gained weight. No F/C/S.  Eyes- Negative  Ear, nose and throat- Has allergies in the last couple weeks.  This year it is better.  Cardiac- Negative  Pulm- Negative  GI- See HPI. No Heartburn.   Genitourinary- Negative  Musculoskeletal- Negative  Neurology- Negative  Dermatology- Negative  Endocrine- Negative  Lymphatic- Negative  Psychiatry- Negative    A complete ROS was otherwise negative except as noted in the HPI.  There were no vitals taken for this visit.   Exam:   GENERAL APPEARANCE: Well developed, well nourished, alert, and in no apparent distress.  EYES: PERRL, EOMI  HENT: Nasal mucosa with no edema and no hyperemia. No nasal polyps.  EARS: Both Ear canals full of \"Wax\", TMs normal  MOUTH: Oral mucosa is moist, without any lesions, no tonsillar enlargement, no oropharyngeal exudate.  NECK: supple, no masses, no thyromegaly.  LYMPHATICS: No significant axillary, cervical, or supraclavicular nodes.  RESP: normal percussion, good air flow throughout.  No crackles. No rhonchi. No wheezes.  CV: Normal S1, S2, regular rhythm, normal rate. No murmur.  No rub. No gallop. No LE edema.   ABDOMEN:  Bowel sounds normal, soft, nontender, no HSM or masses.   MS: extremities normal. No clubbing. No cyanosis.  SKIN: no rash on " limited exam  NEURO: Mentation intact, speech normal, normal strength and tone, normal gait and stance  PSYCH: mentation appears normal. and affect normal/bright.    Results:  No results found for this or any previous visit (from the past 168 hour(s)).    Results as noted above.    PFT Interpretation:  Normal spirometry.  FEV1 improved c/w previous by 270ml.  BEST FEV1 todate  Valid Maneuver    Assessment and plan: Cheo Padron is a 36 year old male with HTN, hyperlipidemia, CHF and premature - CAD. He is a smoker. He is being seen today for CF follow up.     #. CF vs. CFTR disorder: He has essentially normal lung function with no pulmonary symptoms. His CF sweat chloride tests were positive.    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   Will have CF  weigh in on this.  Last CXR from 9/14/2016 per report did not show any bronchiectasis.  7/19/2021: Pulm sx stable. We will obtain Spirometry next visit.    CF exacerbation: Absent    #. Recurrent Acute Pancreatitis: His first episode was on 7/2012 requiring hospitalization. He has had atleast two other episodes prior to that. it was felt to be ETOH induced, it is difficult to r/o  or rule in CF playing a role. His ongoing sx while not typically raise the question of chronic pancreatitis.   7/19/2021:  Appreciate Dr. Beth (GI)/Dr. Davalos input.  Plan to get fecal fat study/EGD and EUS soon to assess for chronic pancreatitis/pancreatic insufficiency.  1/24/22: Pt has not followed up with [facility] and obtain fecal studies or EGD/EUS. Will encourage to complete it.   - He was seen by Nutritionist and recommended 5 -6 small meals/day.    #. Premature CAD: Followed by Dr. Rothman. He had presented with STEMI on 7/1/2012. This required revasc with drug eluting stents x2 from prox to mid LAD.   - currently on Aspirin.    #. Ischemic CMP: His last MRI in 7/2012 was 41% with large WMA consistent LAD distribution infarction. Last Echo on 7/2013 showed an  EF of 40 - 45% with akinesis of the antr and anteroseptal walls from base to apex. No significant valvular disease.  Last Stress test on 5/2015  was neg with 12.8 METS achieved    #. HTN: BP well controlled on Carvedilol and ACEI.    #. Hypertriglyceridemia: He is on Statins.     #.Vitamin D deficiency: Advised to take Vit D regularly.    #. Ongoing Tobacco abuse: He has attempted to quit smoking unsuccessfully and would like to quit.  7/19/2021: Still smoking 5 - 6 cigs/day. He is interested in quitting and has the phone numbers for quit plan.    #. HCM: He has received COVID19 vaccination.  7/19/2021: We will do annuals at next visit.    Presley Alamo MD.  Pulmonary and Critical Care.  01/24/2022  10:02 AM    This encounter was opened in error. Please disregard.      Again, thank you for allowing me to participate in the care of your patient.        Sincerely,        Presley Alamo MD

## 2022-01-24 NOTE — PROGRESS NOTES
"Pt was unable to connect and this appt did not happen.    Reason for Visit  Cheo Padron is a 36year old male who is being seen for RECHECK (Return video visit )    CF HPI  The patient was seen and examined by Presley Alamo MD   Cheo Padron is a 36 year old male  with HTN, hyperlipidemia, Mantoux positive, CHF and premature - CAD. He is a smoker. He was after w/u for pancreatitis and has a child with CF. H was worked up and it showed positive sweat test and diagnosed with CF in 2020.    He was admitted in 7/2020 with alcohol induced pancreatitis.     His symptoms started in December 2019.  It started off with abdominal pain and nausea vomiting.  It lasted for about 3 to 4 days and then it resolved.  This was preceded by binge drinking.  He used to drink 3-4 drinks every day and sometimes on weekends up to 14-15 drinks.  The pain resolved and he was doing well up until February 2020.  This pain recurred along with nausea and vomiting.  This again lasted for a few days and resolved.  This also was associated with binge drinking episode.    The symptoms recurred in July 2020 at which time he did go to the emergency room to have it evaluated.  He was diagnosed with pancreatitis.  Since his hospital stay the pain in his abdomen is slowly improved.  He is now having about a level 3 pain on and off throughout the day.  He notices the pain is worse especially when he eats \"heavy meaty meal\".  The pain is of mild crampy quality most of the time.  And when he eats a heavy meal is the pain is more worse and it last longer.  He is taken to eating light foods mostly salads.  He has lost about 10 pounds in the last 6 months.  He does not having any nausea or vomiting at this time.  Does not have any constipation or loose stools.  He is taking up to 2-3 Tylenols per day to manage the pain.  He has also taken ibuprofen to manage the pain.    He has stopped drinking for good.  Interval history:   Abd pain/fullness " and emesis still persist. Seen by GI. Eats one big meal/day.  No constipation/loose stools. No fat in stools.   No Cough/SOB/CP.     Current Outpatient Medications   Medication     acetaminophen (TYLENOL) 325 MG tablet     amoxicillin (AMOXIL) 500 MG tablet     aspirin (ASA) 81 MG EC tablet     atorvastatin (LIPITOR) 80 MG tablet     carvedilol (COREG) 6.25 MG tablet     cholecalciferol (VITAMIN D3) 25 mcg (1000 units) capsule     fexofenadine (ALLEGRA) 180 MG tablet     ibuprofen (ADVIL/MOTRIN) 400 MG tablet     lisinopril (ZESTRIL) 5 MG tablet     No current facility-administered medications for this visit.     Allergies   Allergen Reactions     Enoxaparin Hives     Urticaria started 4 days after starting lovenox.    Unclear if rash due to lovenox but this seems most likely today (7/9/2012).    One week ago started metoprolol, asa, plavix, lisinopril, simvastatin, and spironolactone.       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     ST elevation myocardial infarction involving left anterior descending (LAD) coronary artery (H) 2012       Past Surgical History:   Procedure Laterality Date     CT CORONARY ANGIOGRAM  07/01/2012    Has two drug eluting stents       Social History     Socioeconomic History     Marital status: Single     Spouse name: Not on file     Number of children: Not on file     Years of education: Not on file     Highest education level: Not on file   Occupational History     Not on file   Tobacco Use     Smoking status: Current Every Day Smoker     Types: Cigarettes     Smokeless tobacco: Never Used   Substance and Sexual Activity     Alcohol use: Not Currently     Drug use: Not Currently     Sexual activity: Not on file   Other Topics Concern     Parent/sibling w/ CABG, MI or angioplasty before 65F 55M?  "Not Asked   Social History Narrative     Not on file     Social Determinants of Health     Financial Resource Strain: Not on file   Food Insecurity: Not on file   Transportation Needs: Not on file   Physical Activity: Not on file   Stress: Not on file   Social Connections: Not on file   Intimate Partner Violence: Not on file   Housing Stability: Not on file       ROS Pulmonary  Constitutional- Denies weight loss, has not gained weight. No F/C/S.  Eyes- Negative  Ear, nose and throat- Has allergies in the last couple weeks.  This year it is better.  Cardiac- Negative  Pulm- Negative  GI- See HPI. No Heartburn.   Genitourinary- Negative  Musculoskeletal- Negative  Neurology- Negative  Dermatology- Negative  Endocrine- Negative  Lymphatic- Negative  Psychiatry- Negative    A complete ROS was otherwise negative except as noted in the HPI.  There were no vitals taken for this visit.   Exam:   GENERAL APPEARANCE: Well developed, well nourished, alert, and in no apparent distress.  EYES: PERRL, EOMI  HENT: Nasal mucosa with no edema and no hyperemia. No nasal polyps.  EARS: Both Ear canals full of \"Wax\", TMs normal  MOUTH: Oral mucosa is moist, without any lesions, no tonsillar enlargement, no oropharyngeal exudate.  NECK: supple, no masses, no thyromegaly.  LYMPHATICS: No significant axillary, cervical, or supraclavicular nodes.  RESP: normal percussion, good air flow throughout.  No crackles. No rhonchi. No wheezes.  CV: Normal S1, S2, regular rhythm, normal rate. No murmur.  No rub. No gallop. No LE edema.   ABDOMEN:  Bowel sounds normal, soft, nontender, no HSM or masses.   MS: extremities normal. No clubbing. No cyanosis.  SKIN: no rash on limited exam  NEURO: Mentation intact, speech normal, normal strength and tone, normal gait and stance  PSYCH: mentation appears normal. and affect normal/bright.    Results:  No results found for this or any previous visit (from the past 168 hour(s)).    Results as noted " above.    PFT Interpretation:  Normal spirometry.  FEV1 improved c/w previous by 270ml.  BEST FEV1 todate  Valid Maneuver    Assessment and plan: Cheo Padron is a 36 year old male with HTN, hyperlipidemia, CHF and premature - CAD. He is a smoker. He is being seen today for CF follow up.     #. CF vs. CFTR disorder: He has essentially normal lung function with no pulmonary symptoms. His CF sweat chloride tests were positive.    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   Will have CF  weigh in on this.  Last CXR from 9/14/2016 per report did not show any bronchiectasis.  7/19/2021: Pulm sx stable. We will obtain Spirometry next visit.    CF exacerbation: Absent    #. Recurrent Acute Pancreatitis: His first episode was on 7/2012 requiring hospitalization. He has had atleast two other episodes prior to that. it was felt to be ETOH induced, it is difficult to r/o  or rule in CF playing a role. His ongoing sx while not typically raise the question of chronic pancreatitis.   7/19/2021:  Appreciate Dr. Beth (GI)/Dr. Davalos input.  Plan to get fecal fat study/EGD and EUS soon to assess for chronic pancreatitis/pancreatic insufficiency.  1/24/22: Pt has not followed up with [facility] and obtain fecal studies or EGD/EUS. Will encourage to complete it.   - He was seen by Nutritionist and recommended 5 -6 small meals/day.    #. Premature CAD: Followed by Dr. Rothman. He had presented with STEMI on 7/1/2012. This required revasc with drug eluting stents x2 from prox to mid LAD.   - currently on Aspirin.    #. Ischemic CMP: His last MRI in 7/2012 was 41% with large WMA consistent LAD distribution infarction. Last Echo on 7/2013 showed an EF of 40 - 45% with akinesis of the antr and anteroseptal walls from base to apex. No significant valvular disease.  Last Stress test on 5/2015  was neg with 12.8 METS achieved    #. HTN: BP well controlled on Carvedilol and ACEI.    #. Hypertriglyceridemia: He is on  Statins.     #.Vitamin D deficiency: Advised to take Vit D regularly.    #. Ongoing Tobacco abuse: He has attempted to quit smoking unsuccessfully and would like to quit.  7/19/2021: Still smoking 5 - 6 cigs/day. He is interested in quitting and has the phone numbers for quit plan.    #. HCM: He has received COVID19 vaccination.  7/19/2021: We will do annuals at next visit.    Presley Alamo MD.  Pulmonary and Critical Care.  01/24/2022  10:02 AM    This encounter was opened in error. Please disregard.

## 2022-07-08 ENCOUNTER — TELEPHONE (OUTPATIENT)
Dept: PULMONOLOGY | Facility: CLINIC | Age: 37
End: 2022-07-08

## 2022-07-08 NOTE — TELEPHONE ENCOUNTER
"Patient called Central Scheduling line requesting appointment and reporting symptoms: Nausea, vomiting, \"unable to keep things down\", abdominal pain, pancreatitis symptoms since mid-June. Wondering about modulator and enzymes.    RN attempted to reach patient to discuss his current symptoms. Unable to reach. Left voicemail with name, call back and our CF line to call back with symptoms rather than central scheduling.    RN will also send patient [employer] message.    JOSE ANGEL Jay  "

## 2024-05-14 ENCOUNTER — OFFICE VISIT (OUTPATIENT)
Dept: FAMILY MEDICINE | Facility: CLINIC | Age: 39
End: 2024-05-14

## 2024-05-14 VITALS
DIASTOLIC BLOOD PRESSURE: 76 MMHG | BODY MASS INDEX: 22.87 KG/M2 | OXYGEN SATURATION: 100 % | RESPIRATION RATE: 16 BRPM | WEIGHT: 146 LBS | HEART RATE: 89 BPM | SYSTOLIC BLOOD PRESSURE: 118 MMHG | TEMPERATURE: 98.7 F

## 2024-05-14 DIAGNOSIS — I50.9 CONGESTIVE HEART FAILURE, UNSPECIFIED HF CHRONICITY, UNSPECIFIED HEART FAILURE TYPE (H): ICD-10-CM

## 2024-05-14 DIAGNOSIS — Z11.4 SCREENING FOR HIV (HUMAN IMMUNODEFICIENCY VIRUS): ICD-10-CM

## 2024-05-14 DIAGNOSIS — Z11.59 NEED FOR HEPATITIS C SCREENING TEST: ICD-10-CM

## 2024-05-14 DIAGNOSIS — R10.13 ABDOMINAL PAIN, EPIGASTRIC: Primary | ICD-10-CM

## 2024-05-14 DIAGNOSIS — J30.2 SEASONAL ALLERGIC RHINITIS, UNSPECIFIED TRIGGER: ICD-10-CM

## 2024-05-14 DIAGNOSIS — E78.5 HYPERLIPIDEMIA LDL GOAL <130: ICD-10-CM

## 2024-05-14 LAB
ALBUMIN SERPL BCG-MCNC: 4.2 G/DL (ref 3.5–5.2)
ALP SERPL-CCNC: 72 U/L (ref 40–150)
ALT SERPL W P-5'-P-CCNC: 15 U/L (ref 0–70)
ANION GAP SERPL CALCULATED.3IONS-SCNC: 7 MMOL/L (ref 7–15)
AST SERPL W P-5'-P-CCNC: 21 U/L (ref 0–45)
BILIRUB SERPL-MCNC: 0.2 MG/DL
BUN SERPL-MCNC: 4.9 MG/DL (ref 6–20)
CALCIUM SERPL-MCNC: 9.2 MG/DL (ref 8.6–10)
CHLORIDE SERPL-SCNC: 104 MMOL/L (ref 98–107)
CHOLEST SERPL-MCNC: 142 MG/DL
CREAT SERPL-MCNC: 1.08 MG/DL (ref 0.67–1.17)
DEPRECATED HCO3 PLAS-SCNC: 29 MMOL/L (ref 22–29)
EGFRCR SERPLBLD CKD-EPI 2021: 89 ML/MIN/1.73M2
ERYTHROCYTE [DISTWIDTH] IN BLOOD BY AUTOMATED COUNT: 11.9 % (ref 10–15)
FASTING STATUS PATIENT QL REPORTED: YES
FASTING STATUS PATIENT QL REPORTED: YES
GLUCOSE SERPL-MCNC: 82 MG/DL (ref 70–99)
HCT VFR BLD AUTO: 47.6 % (ref 40–53)
HCV AB SERPL QL IA: NONREACTIVE
HDLC SERPL-MCNC: 41 MG/DL
HGB BLD-MCNC: 16.3 G/DL (ref 13.3–17.7)
LDLC SERPL CALC-MCNC: 67 MG/DL
LIPASE SERPL-CCNC: 53 U/L (ref 13–60)
MCH RBC QN AUTO: 31.8 PG (ref 26.5–33)
MCHC RBC AUTO-ENTMCNC: 34.2 G/DL (ref 31.5–36.5)
MCV RBC AUTO: 93 FL (ref 78–100)
NONHDLC SERPL-MCNC: 101 MG/DL
PLATELET # BLD AUTO: 309 10E3/UL (ref 150–450)
POTASSIUM SERPL-SCNC: 4.7 MMOL/L (ref 3.4–5.3)
PROT SERPL-MCNC: 7.3 G/DL (ref 6.4–8.3)
RBC # BLD AUTO: 5.13 10E6/UL (ref 4.4–5.9)
SODIUM SERPL-SCNC: 140 MMOL/L (ref 135–145)
TRIGL SERPL-MCNC: 168 MG/DL
WBC # BLD AUTO: 6.9 10E3/UL (ref 4–11)

## 2024-05-14 PROCEDURE — 85027 COMPLETE CBC AUTOMATED: CPT | Performed by: FAMILY MEDICINE

## 2024-05-14 PROCEDURE — 80061 LIPID PANEL: CPT | Performed by: FAMILY MEDICINE

## 2024-05-14 PROCEDURE — 86803 HEPATITIS C AB TEST: CPT | Performed by: FAMILY MEDICINE

## 2024-05-14 PROCEDURE — 99204 OFFICE O/P NEW MOD 45 MIN: CPT | Performed by: FAMILY MEDICINE

## 2024-05-14 PROCEDURE — 80053 COMPREHEN METABOLIC PANEL: CPT | Performed by: FAMILY MEDICINE

## 2024-05-14 PROCEDURE — 83690 ASSAY OF LIPASE: CPT | Performed by: FAMILY MEDICINE

## 2024-05-14 PROCEDURE — G2211 COMPLEX E/M VISIT ADD ON: HCPCS | Performed by: FAMILY MEDICINE

## 2024-05-14 PROCEDURE — 36415 COLL VENOUS BLD VENIPUNCTURE: CPT | Performed by: FAMILY MEDICINE

## 2024-05-14 RX ORDER — LISINOPRIL 5 MG/1
5 TABLET ORAL DAILY
Status: CANCELLED | OUTPATIENT
Start: 2024-05-14

## 2024-05-14 RX ORDER — FEXOFENADINE HCL 180 MG/1
180 TABLET ORAL DAILY
Qty: 30 TABLET | Refills: 0 | Status: SHIPPED | OUTPATIENT
Start: 2024-05-14

## 2024-05-14 RX ORDER — CARVEDILOL 6.25 MG/1
6.25 TABLET ORAL 2 TIMES DAILY WITH MEALS
Qty: 60 TABLET | Refills: 0 | Status: SHIPPED | OUTPATIENT
Start: 2024-05-14

## 2024-05-14 ASSESSMENT — ENCOUNTER SYMPTOMS: ABDOMINAL PAIN: 1

## 2024-05-14 NOTE — PROGRESS NOTES
Assessment & Plan     Abdominal pain, epigastric  Did go over the possible differential included gastritis, gastroenteritis etc., trial of PPI discussed, consider referral to GI for long-term management if not improving.  - Comprehensive metabolic panel; Future  - CBC with platelets; Future  - Lipase; Future  - Comprehensive metabolic panel  - CBC with platelets  - Lipase    Congestive heart failure, unspecified HF chronicity, unspecified heart failure type (H)  Patient out of medication, carvedilol was refilled, he is not sure if he is supposed to take lisinopril , blood pressure has been normal here today, urged him to follow-up with cardiology for long-term management.  - carvedilol (COREG) 6.25 MG tablet; Take 1 tablet (6.25 mg) by mouth 2 times daily (with meals) TK 1 AND 1/2 TS PO BID WC    Screening for HIV (human immunodeficiency virus)      Need for hepatitis C screening test    - Hepatitis C Screen Reflex to HCV RNA Quant and Genotype; Future  - Hepatitis C Screen Reflex to HCV RNA Quant and Genotype    Seasonal allergic rhinitis, unspecified trigger    - fexofenadine (ALLEGRA) 180 MG tablet; Take 1 tablet (180 mg) by mouth daily    Hyperlipidemia LDL goal <130    - Lipid panel reflex to direct LDL Non-fasting; Future  - atorvastatin (LIPITOR) 80 MG tablet; Take 1 tablet (80 mg) by mouth daily for 30 days  - Lipid panel reflex to direct LDL Non-fasting          Nicotine/Tobacco Cessation  He reports that he has been smoking cigarettes. He has never used smokeless tobacco.  Nicotine/Tobacco Cessation Plan  Self help information given to patient        Regular exercise    Neftali Grider is a 39 year old, presenting for the following health issues:  Follow up conditions and Abdominal Pain (X 3-4 days. )      5/14/2024    11:24 AM   Additional Questions   Roomed by Marciano SANZ     Abdominal Pain     History of Present Illness       Reason for visit:  Just for a check up    He eats 2-3 servings of fruits and  vegetables daily.He consumes 2 sweetened beverage(s) daily.He exercises with enough effort to increase his heart rate 60 or more minutes per day.  He exercises with enough effort to increase his heart rate 3 or less days per week. He is missing 2 dose(s) of medications per week.       He is here today accompanied by his significant other, complaining of sensation of food getting stuck in his stomach, abdominal cramping, he did vomited at the onset of the symptoms about 4 days ago, but nothing recently, denies any fever or chills, has episode of diarrhea in the past from pancreatitis but nothing recently, apparently was also diagnosed with cystic fibrosis and alcoholic pancreatitis in the past.  Denies recent alcohol ingestion.  He has CHF, coronary artery disease status post stenting, followed by cardiologist but has not seen them for quite some times, he would like refill of his medication      Review of Systems  Constitutional, HEENT, cardiovascular, pulmonary, gi and gu systems are negative, except as otherwise noted.      Objective    /76 (BP Location: Left arm, Patient Position: Sitting, Cuff Size: Adult Regular)   Pulse 89   Temp 98.7  F (37.1  C) (Temporal)   Resp 16   Wt 66.2 kg (146 lb)   SpO2 100%   BMI 22.87 kg/m    Body mass index is 22.87 kg/m .  Physical Exam   GENERAL: alert and no distress  NECK: no adenopathy, no asymmetry, masses, or scars  RESP: lungs clear to auscultation - no rales, rhonchi or wheezes  CV: regular rate and rhythm, normal S1 S2, no S3 or S4, no murmur, click or rub, no peripheral edema  ABDOMEN: soft, nontender, no hepatosplenomegaly, no masses and bowel sounds normal  MS: no gross musculoskeletal defects noted, no edema    Results for orders placed or performed in visit on 05/14/24   Hepatitis C Screen Reflex to HCV RNA Quant and Genotype     Status: Normal   Result Value Ref Range    Hepatitis C Antibody Nonreactive Nonreactive   Lipid panel reflex to direct LDL  Non-fasting     Status: Abnormal   Result Value Ref Range    Cholesterol 142 <200 mg/dL    Triglycerides 168 (H) <150 mg/dL    Direct Measure HDL 41 >=40 mg/dL    LDL Cholesterol Calculated 67 <=100 mg/dL    Non HDL Cholesterol 101 <130 mg/dL    Patient Fasting > 8hrs? Yes     Narrative    Cholesterol  Desirable:  <200 mg/dL    Triglycerides  Normal:  Less than 150 mg/dL  Borderline High:  150-199 mg/dL  High:  200-499 mg/dL  Very High:  Greater than or equal to 500 mg/dL    Direct Measure HDL  Female:  Greater than or equal to 50 mg/dL   Male:  Greater than or equal to 40 mg/dL    LDL Cholesterol  Desirable:  <100mg/dL  Above Desirable:  100-129 mg/dL   Borderline High:  130-159 mg/dL   High:  160-189 mg/dL   Very High:  >= 190 mg/dL    Non HDL Cholesterol  Desirable:  130 mg/dL  Above Desirable:  130-159 mg/dL  Borderline High:  160-189 mg/dL  High:  190-219 mg/dL  Very High:  Greater than or equal to 220 mg/dL   Comprehensive metabolic panel     Status: Abnormal   Result Value Ref Range    Sodium 140 135 - 145 mmol/L    Potassium 4.7 3.4 - 5.3 mmol/L    Carbon Dioxide (CO2) 29 22 - 29 mmol/L    Anion Gap 7 7 - 15 mmol/L    Urea Nitrogen 4.9 (L) 6.0 - 20.0 mg/dL    Creatinine 1.08 0.67 - 1.17 mg/dL    GFR Estimate 89 >60 mL/min/1.73m2    Calcium 9.2 8.6 - 10.0 mg/dL    Chloride 104 98 - 107 mmol/L    Glucose 82 70 - 99 mg/dL    Alkaline Phosphatase 72 40 - 150 U/L    AST 21 0 - 45 U/L    ALT 15 0 - 70 U/L    Protein Total 7.3 6.4 - 8.3 g/dL    Albumin 4.2 3.5 - 5.2 g/dL    Bilirubin Total 0.2 <=1.2 mg/dL    Patient Fasting > 8hrs? Yes    CBC with platelets     Status: Normal   Result Value Ref Range    WBC Count 6.9 4.0 - 11.0 10e3/uL    RBC Count 5.13 4.40 - 5.90 10e6/uL    Hemoglobin 16.3 13.3 - 17.7 g/dL    Hematocrit 47.6 40.0 - 53.0 %    MCV 93 78 - 100 fL    MCH 31.8 26.5 - 33.0 pg    MCHC 34.2 31.5 - 36.5 g/dL    RDW 11.9 10.0 - 15.0 %    Platelet Count 309 150 - 450 10e3/uL   Lipase     Status: Normal    Result Value Ref Range    Lipase 53 13 - 60 U/L           Signed Electronically by: Iris Shukla MD      Prior to immunization administration, verified patients identity using patient s name and date of birth. Please see Immunization Activity for additional information.     Screening Questionnaire for Adult Immunization    Are you sick today?   No   Do you have allergies to medications, food, a vaccine component or latex?   Yes   Have you ever had a serious reaction after receiving a vaccination?   No   Do you have a long-term health problem with heart, lung, kidney, or metabolic disease (e.g., diabetes), asthma, a blood disorder, no spleen, complement component deficiency, a cochlear implant, or a spinal fluid leak?  Are you on long-term aspirin therapy?   Yes   Do you have cancer, leukemia, HIV/AIDS, or any other immune system problem?   No   Do you have a parent, brother, or sister with an immune system problem?   No   In the past 3 months, have you taken medications that affect  your immune system, such as prednisone, other steroids, or anticancer drugs; drugs for the treatment of rheumatoid arthritis, Crohn s disease, or psoriasis; or have you had radiation treatments?   No   Have you had a seizure, or a brain or other nervous system problem?   No   During the past year, have you received a transfusion of blood or blood    products, or been given immune (gamma) globulin or antiviral drug?   No   For women: Are you pregnant or is there a chance you could become       pregnant during the next month?   No   Have you received any vaccinations in the past 4 weeks?   No     Immunization questionnaire was positive for at least one answer.  Notified provider.    This note was completed in part using a voice recognition software, any grammatical or context distortion are unintentional and inherent to the software.    Patient instructed to remain in clinic for 15 minutes afterwards, and to report any adverse  reactions.     Screening performed by Marciano Deras MA on 5/14/2024 at 11:31 AM.

## 2024-05-15 ENCOUNTER — TELEPHONE (OUTPATIENT)
Dept: PULMONOLOGY | Facility: CLINIC | Age: 39
End: 2024-05-15

## 2024-05-15 RX ORDER — ATORVASTATIN CALCIUM 80 MG/1
80 TABLET, FILM COATED ORAL DAILY
Qty: 30 TABLET | Refills: 0 | Status: SHIPPED | OUTPATIENT
Start: 2024-05-15 | End: 2024-06-14

## 2024-05-15 NOTE — TELEPHONE ENCOUNTER
"Patient sent [employer] message to schedule an appointment with Dr. Alamo. In the notes, patient indicated \"Weight..appetite..vomitting..energy..other..\"    RN attempted to reach patient via phone to follow up on symptoms. Unable to reach. Left voicemail with name and call back number.    RN also sent patient a [employer] message.    JOSE ANGEL Jay    "

## 2024-07-14 ENCOUNTER — HEALTH MAINTENANCE LETTER (OUTPATIENT)
Age: 39
End: 2024-07-14

## 2024-10-14 ENCOUNTER — TELEPHONE (OUTPATIENT)
Dept: PULMONOLOGY | Facility: CLINIC | Age: 39
End: 2024-10-14

## 2024-10-14 NOTE — TELEPHONE ENCOUNTER
Left Voicemail (1st Attempt) and Sent Mychart (1st Attempt) for the patient to call back and schedule the following:    Appointment type: Return CF  Provider: Alamo/ next avail  Return date: Next Avail  Specialty phone number: [phone]  Additional appointment(s) needed: cf loop?  Additonal Notes: pt sent req to schedul return cf visit. Per Stefania: he can see anyone that has an opening that works for him

## 2024-10-21 NOTE — TELEPHONE ENCOUNTER
Left Voicemail (2nd Attempt) for the patient to call back and schedule the following:    Appointment type: Return CF  Provider: Alamo/ next avail  Return date: Next Avail  Specialty phone number: [phone]  Additional appointment(s) needed: cf loop?  Additonal Notes: pt sent req to schedul return cf visit. Per Stefania: he can see anyone that has an opening that works for him

## 2025-07-19 ENCOUNTER — HEALTH MAINTENANCE LETTER (OUTPATIENT)
Age: 40
End: 2025-07-19